# Patient Record
Sex: MALE | Race: WHITE | Employment: OTHER | URBAN - METROPOLITAN AREA
[De-identification: names, ages, dates, MRNs, and addresses within clinical notes are randomized per-mention and may not be internally consistent; named-entity substitution may affect disease eponyms.]

---

## 2017-05-25 ENCOUNTER — HOSPITAL ENCOUNTER (EMERGENCY)
Age: 77
Discharge: HOME | End: 2017-05-25
Attending: STUDENT IN AN ORGANIZED HEALTH CARE EDUCATION/TRAINING PROGRAM
Payer: MEDICARE

## 2017-05-25 ENCOUNTER — APPOINTMENT (OUTPATIENT)
Dept: GENERAL RADIOLOGY | Age: 77
End: 2017-05-25
Attending: EMERGENCY MEDICINE
Payer: MEDICARE

## 2017-05-25 VITALS
TEMPERATURE: 97.8 F | SYSTOLIC BLOOD PRESSURE: 170 MMHG | DIASTOLIC BLOOD PRESSURE: 94 MMHG | HEART RATE: 66 BPM | BODY MASS INDEX: 26.63 KG/M2 | OXYGEN SATURATION: 96 % | WEIGHT: 186 LBS | RESPIRATION RATE: 16 BRPM | HEIGHT: 70 IN

## 2017-05-25 DIAGNOSIS — R06.09 DYSPNEA ON EXERTION: ICD-10-CM

## 2017-05-25 DIAGNOSIS — R53.83 FATIGUE, UNSPECIFIED TYPE: Primary | ICD-10-CM

## 2017-05-25 LAB
ALBUMIN SERPL BCP-MCNC: 3.5 G/DL (ref 3.5–5)
ALBUMIN/GLOB SERPL: 1 {RATIO} (ref 1.1–2.2)
ALP SERPL-CCNC: 142 U/L (ref 45–117)
ALT SERPL-CCNC: 27 U/L (ref 12–78)
ANION GAP BLD CALC-SCNC: 8 MMOL/L (ref 5–15)
AST SERPL W P-5'-P-CCNC: 20 U/L (ref 15–37)
ATRIAL RATE: 85 BPM
BASOPHILS # BLD AUTO: 0.1 K/UL (ref 0–0.1)
BASOPHILS # BLD: 1 % (ref 0–1)
BILIRUB SERPL-MCNC: 1.7 MG/DL (ref 0.2–1)
BUN SERPL-MCNC: 19 MG/DL (ref 6–20)
BUN/CREAT SERPL: 17 (ref 12–20)
CALCIUM SERPL-MCNC: 9 MG/DL (ref 8.5–10.1)
CALCULATED R AXIS, ECG10: -73 DEGREES
CALCULATED T AXIS, ECG11: 104 DEGREES
CHLORIDE SERPL-SCNC: 108 MMOL/L (ref 97–108)
CK MB CFR SERPL CALC: 4.3 % (ref 0–2.5)
CK MB SERPL-MCNC: 2.4 NG/ML (ref 5–25)
CK SERPL-CCNC: 56 U/L (ref 39–308)
CO2 SERPL-SCNC: 26 MMOL/L (ref 21–32)
CREAT SERPL-MCNC: 1.1 MG/DL (ref 0.7–1.3)
DIAGNOSIS, 93000: NORMAL
EOSINOPHIL # BLD: 0.2 K/UL (ref 0–0.4)
EOSINOPHIL NFR BLD: 2 % (ref 0–7)
ERYTHROCYTE [DISTWIDTH] IN BLOOD BY AUTOMATED COUNT: 17 % (ref 11.5–14.5)
GLOBULIN SER CALC-MCNC: 3.4 G/DL (ref 2–4)
GLUCOSE SERPL-MCNC: 121 MG/DL (ref 65–100)
HCT VFR BLD AUTO: 36.2 % (ref 36.6–50.3)
HGB BLD-MCNC: 11.5 G/DL (ref 12.1–17)
IRON SATN MFR SERPL: 11 % (ref 20–50)
IRON SERPL-MCNC: 46 UG/DL (ref 35–150)
LYMPHOCYTES # BLD AUTO: 15 % (ref 12–49)
LYMPHOCYTES # BLD: 1 K/UL (ref 0.8–3.5)
MCH RBC QN AUTO: 23.2 PG (ref 26–34)
MCHC RBC AUTO-ENTMCNC: 31.8 G/DL (ref 30–36.5)
MCV RBC AUTO: 73.1 FL (ref 80–99)
MONOCYTES # BLD: 0.6 K/UL (ref 0–1)
MONOCYTES NFR BLD AUTO: 9 % (ref 5–13)
NEUTS SEG # BLD: 4.6 K/UL (ref 1.8–8)
NEUTS SEG NFR BLD AUTO: 73 % (ref 32–75)
PLATELET # BLD AUTO: 239 K/UL (ref 150–400)
POTASSIUM SERPL-SCNC: 3.5 MMOL/L (ref 3.5–5.1)
PROT SERPL-MCNC: 6.9 G/DL (ref 6.4–8.2)
Q-T INTERVAL, ECG07: 488 MS
QRS DURATION, ECG06: 186 MS
QTC CALCULATION (BEZET), ECG08: 518 MS
RBC # BLD AUTO: 4.95 M/UL (ref 4.1–5.7)
SODIUM SERPL-SCNC: 142 MMOL/L (ref 136–145)
TIBC SERPL-MCNC: 421 UG/DL (ref 250–450)
TROPONIN I SERPL-MCNC: <0.04 NG/ML
VENTRICULAR RATE, ECG03: 68 BPM
WBC # BLD AUTO: 6.4 K/UL (ref 4.1–11.1)

## 2017-05-25 PROCEDURE — 99284 EMERGENCY DEPT VISIT MOD MDM: CPT

## 2017-05-25 PROCEDURE — 36415 COLL VENOUS BLD VENIPUNCTURE: CPT | Performed by: STUDENT IN AN ORGANIZED HEALTH CARE EDUCATION/TRAINING PROGRAM

## 2017-05-25 PROCEDURE — 71020 XR CHEST PA LAT: CPT

## 2017-05-25 PROCEDURE — 93971 EXTREMITY STUDY: CPT

## 2017-05-25 PROCEDURE — 93005 ELECTROCARDIOGRAM TRACING: CPT

## 2017-05-25 PROCEDURE — 82550 ASSAY OF CK (CPK): CPT | Performed by: EMERGENCY MEDICINE

## 2017-05-25 PROCEDURE — 84484 ASSAY OF TROPONIN QUANT: CPT | Performed by: EMERGENCY MEDICINE

## 2017-05-25 PROCEDURE — 83540 ASSAY OF IRON: CPT | Performed by: STUDENT IN AN ORGANIZED HEALTH CARE EDUCATION/TRAINING PROGRAM

## 2017-05-25 PROCEDURE — 85025 COMPLETE CBC W/AUTO DIFF WBC: CPT | Performed by: EMERGENCY MEDICINE

## 2017-05-25 PROCEDURE — 80053 COMPREHEN METABOLIC PANEL: CPT | Performed by: EMERGENCY MEDICINE

## 2017-05-25 NOTE — ED NOTES
Pt resting on stretcher. Per Dr. Briseyda Meyer [facility] for pt to have water. Awaiting duplex at this moment. Will continue to monitor.

## 2017-05-25 NOTE — PROCEDURES
[facility]  *** FINAL REPORT ***    Name: Mariajose Mauricio  MRN: [id number]  : 20 Dec 1940  HIS Order #: [id number]  [id number] [facility] Visit #: [id number]  Date: 25 May 2017    TYPE OF TEST: Peripheral Venous Testing    REASON FOR TEST  Limb swelling    Right Leg:-  Deep venous thrombosis:           No  Superficial venous thrombosis:    No  Deep venous insufficiency:        Not examined  Superficial venous insufficiency: Not examined      INTERPRETATION/FINDINGS  PROCEDURE:  Color duplex ultrasound imaging of lower extremity veins. FINDINGS:       Right: The common femoral, deep femoral, femoral, popliteal,  posterior tibial, peroneal, and great saphenous are patent and without   evidence of thrombus; each is is fully compressible and there is no  narrowing of the flow channel on color Doppler imaging. Phasic flow  is observed in the common femoral vein. Left:   The common femoral vein is patent and without evidence of   thrombus. Phasic flow is observed. This extremity was not otherwise   evaluated. IMPRESSION:  No evidence of right lower extremity vein thrombosis. ADDITIONAL COMMENTS    I have personally reviewed the data relevant to the interpretation of  this  study.     TECHNOLOGIST: Cinderella Hamman, RVS, RCS  Signed: 2017 02:16 PM    PHYSICIAN: Drinda Kawasaki, MD  Signed: 2017 02:27 PM

## 2017-05-25 NOTE — DISCHARGE INSTRUCTIONS
We hope that we have addressed all of your medical concerns. The examination and treatment you received in the Emergency Department were for an emergent problem and were not intended as complete care. It is important that you follow up with your healthcare provider(s) for ongoing care. If your symptoms worsen or do not improve as expected, and you are unable to reach your usual health care provider(s), you should return to the Emergency Department. Today's healthcare is undergoing tremendous change, and patient satisfaction surveys are one of the many tools to assess the quality of medical care. You may receive a survey from the [employer] regarding your experience in the Emergency Department. I hope that your experience has been completely positive, particularly the medical care that I provided. As such, please participate in the survey; anything less than excellent does not meet my expectations or intentions. [facility] and 8 [facility] participate in nationally recognized quality of care measures. If your blood pressure is greater than 120/80, as reported below, we urge that you seek medical care to address the potential of high blood pressure, commonly known as hypertension. Hypertension can be hereditary or can be caused by certain medical conditions, pain, stress, or \"white coat syndrome. \"       Please make an appointment with your health care provider(s) for follow up of your Emergency Department visit. VITALS:   Patient Vitals for the past 8 hrs:   Temp Pulse Resp BP SpO2   05/25/17 1505 97.7 °F (36.5 °C) 63 16 (!) 162/93 95 %   05/25/17 1420 - 63 - - 97 %   05/25/17 1155 - - - (!) 148/94 -   05/25/17 1012 98.2 °F (36.8 °C) 70 18 (!) 158/108 96 %          Thank you for allowing us to provide you with medical care today. We realize that you have many choices for your emergency care needs.   Please choose us in the future for any continued health care needs. Luly Bernal [facility], [address]   Office: [phone]            Recent Results (from the past 24 hour(s))   EKG, 12 LEAD, INITIAL    Collection Time: 05/25/17 10:17 AM   Result Value Ref Range    Ventricular Rate 68 BPM    Atrial Rate 85 BPM    QRS Duration 186 ms    Q-T Interval 488 ms    QTC Calculation (Bezet) 518 ms    Calculated R Axis -73 degrees    Calculated T Axis 104 degrees    Diagnosis       Electronic ventricular pacemaker premature ventricular complexes  Left axis deviation  Nonspecific intraventricular block  When compared with ECG of 03-MAY-2016 20:20,  Electronic ventricular pacemaker has replaced Sinus rhythm  Confirmed by Guillermo Grijalva MD. ([id number]) on 5/25/2017 12:46:12 PM     CBC WITH AUTOMATED DIFF    Collection Time: 05/25/17 10:25 AM   Result Value Ref Range    WBC 6.4 4.1 - 11.1 K/uL    RBC 4.95 4.10 - 5.70 M/uL    HGB 11.5 (L) 12.1 - 17.0 g/dL    HCT 36.2 (L) 36.6 - 50.3 %    MCV 73.1 (L) 80.0 - 99.0 FL    MCH 23.2 (L) 26.0 - 34.0 PG    MCHC 31.8 30.0 - 36.5 g/dL    RDW 17.0 (H) 11.5 - 14.5 %    PLATELET 816 282 - 667 K/uL    NEUTROPHILS 73 32 - 75 %    LYMPHOCYTES 15 12 - 49 %    MONOCYTES 9 5 - 13 %    EOSINOPHILS 2 0 - 7 %    BASOPHILS 1 0 - 1 %    ABS. NEUTROPHILS 4.6 1.8 - 8.0 K/UL    ABS. LYMPHOCYTES 1.0 0.8 - 3.5 K/UL    ABS. MONOCYTES 0.6 0.0 - 1.0 K/UL    ABS. EOSINOPHILS 0.2 0.0 - 0.4 K/UL    ABS.  BASOPHILS 0.1 0.0 - 0.1 K/UL   METABOLIC PANEL, COMPREHENSIVE    Collection Time: 05/25/17 10:25 AM   Result Value Ref Range    Sodium 142 136 - 145 mmol/L    Potassium 3.5 3.5 - 5.1 mmol/L    Chloride 108 97 - 108 mmol/L    CO2 26 21 - 32 mmol/L    Anion gap 8 5 - 15 mmol/L    Glucose 121 (H) 65 - 100 mg/dL    BUN 19 6 - 20 MG/DL    Creatinine 1.10 0.70 - 1.30 MG/DL    BUN/Creatinine ratio 17 12 - 20      GFR est AA >60 >60 ml/min/1.73m2    GFR est non-AA >60 >60 ml/min/1.73m2    Calcium 9.0 8.5 - 10.1 MG/DL    Bilirubin, total 1.7 (H) 0.2 - 1.0 MG/DL    ALT (SGPT) 27 12 - 78 U/L    AST (SGOT) 20 15 - 37 U/L    Alk. phosphatase 142 (H) 45 - 117 U/L    Protein, total 6.9 6.4 - 8.2 g/dL    Albumin 3.5 3.5 - 5.0 g/dL    Globulin 3.4 2.0 - 4.0 g/dL    A-G Ratio 1.0 (L) 1.1 - 2.2     TROPONIN I    Collection Time: 05/25/17 10:25 AM   Result Value Ref Range    Troponin-I, Qt. <0.04 <0.05 ng/mL   CK W/ CKMB & INDEX    Collection Time: 05/25/17 10:25 AM   Result Value Ref Range    CK 56 39 - 308 U/L    CK - MB 2.4 <3.6 NG/ML    CK-MB Index 4.3 (H) 0 - 2.5     IRON PROFILE    Collection Time: 05/25/17 10:25 AM   Result Value Ref Range    Iron 46 35 - 150 ug/dL    TIBC 421 250 - 450 ug/dL    Iron % saturation 11 (L) 20 - 50 %       Xr Chest Pa Lat    Result Date: 5/25/2017  EXAM:  XR CHEST PA LAT INDICATION:  Bilateral foot swelling and shortness of breath that has worsened over the last several weeks. COMPARISON: 1/18/2015 TECHNIQUE: PA and lateral chest views FINDINGS: The left chest ICD and wires are stable. The cardiomediastinal contours are stable. The pulmonary vasculature is within normal limits. The lungs and pleural spaces are clear. There is no pneumothorax. The bones and upper abdomen are stable. IMPRESSION: No acute process. Stable exam.            Fatigue: Care Instructions  Your Care Instructions  Fatigue is a feeling of tiredness, exhaustion, or lack of energy. You may feel fatigue because of too much or not enough activity. It can also come from stress, lack of sleep, boredom, and poor diet. Many medical problems, such as viral infections, can cause fatigue. Emotional problems, especially depression, are often the cause of fatigue. Fatigue is most often a symptom of another problem. Treatment for fatigue depends on the cause. For example, if you have fatigue because you have a certain health problem, treating this problem also treats your fatigue.  If depression or anxiety is the cause, treatment may help. Follow-up care is a key part of your treatment and safety. Be sure to make and go to all appointments, and call your doctor if you are having problems. It's also a good idea to know your test results and keep a list of the medicines you take. How can you care for yourself at home? · Get regular exercise. But don't overdo it. Go back and forth between rest and exercise. · Get plenty of rest.  · Eat a healthy diet. Do not skip meals, especially breakfast.  · Reduce your use of caffeine, tobacco, and alcohol. Caffeine is most often found in coffee, tea, cola drinks, and chocolate. · Limit medicines that can cause fatigue. This includes tranquilizers and cold and allergy medicines. When should you call for help? Watch closely for changes in your health, and be sure to contact your doctor if:  · You have new symptoms such as fever or a rash. · Your fatigue gets worse. · You have been feeling down, depressed, or hopeless. Or you may have lost interest in things that you usually enjoy. · You are not getting better as expected. Where can you learn more? Go to http://milton-nadege.info/. Enter P066 in the search box to learn more about \"Fatigue: Care Instructions. \"  Current as of: May 27, 2016  Content Version: 11.2  © 8753-7181 [employer]. Care instructions adapted under license by [employer] (which disclaims liability or warranty for this information). If you have questions about a medical condition or this instruction, always ask your healthcare professional. [place] [address] any warranty or liability for your use of this information. Shortness of Breath: Care Instructions  Your Care Instructions  Shortness of breath has many causes. Sometimes conditions such as anxiety can lead to shortness of breath. Some people get mild shortness of breath when they exercise.  Trouble breathing also can be a symptom of a serious problem, such as asthma, lung disease, emphysema, heart problems, and pneumonia. If your shortness of breath continues, you may need tests and treatment. Watch for any changes in your breathing and other symptoms. Follow-up care is a key part of your treatment and safety. Be sure to make and go to all appointments, and call your doctor if you are having problems. Its also a good idea to know your test results and keep a list of the medicines you take. How can you care for yourself at home? · Do not smoke or allow others to smoke around you. If you need help quitting, talk to your doctor about stop-smoking programs and medicines. These can increase your chances of quitting for good. · Get plenty of rest and sleep. · Take your medicines exactly as prescribed. Call your doctor if you think you are having a problem with your medicine. · Find healthy ways to deal with stress. ¨ Exercise daily. ¨ Get plenty of sleep. ¨ Eat regularly and well. When should you call for help? Call 911 anytime you think you may need emergency care. For example, call if:  · You have severe shortness of breath. · You have symptoms of a heart attack. These may include:  ¨ Chest pain or pressure, or a strange feeling in the chest.  ¨ Sweating. ¨ Shortness of breath. ¨ Nausea or vomiting. ¨ Pain, pressure, or a strange feeling in the back, neck, jaw, or upper belly or in one or both shoulders or arms. ¨ Lightheadedness or sudden weakness. ¨ A fast or irregular heartbeat. After you call 911, the  may tell you to chew 1 adult-strength or 2 to 4 low-dose aspirin. Wait for an ambulance. Do not try to drive yourself. Call your doctor now or seek immediate medical care if:  · Your shortness of breath gets worse or you start to wheeze. Wheezing is a high-pitched sound when you breathe. · You wake up at night out of breath or have to prop your head up on several pillows to breathe.   · You are short of breath after only light activity or while at rest.  Watch closely for changes in your health, and be sure to contact your doctor if:  · You do not get better over the next 1 to 2 days. Where can you learn more? Go to http://milton-nadege.info/. Enter S780 in the search box to learn more about \"Shortness of Breath: Care Instructions. \"  Current as of: May 23, 2016  Content Version: 11.2  © 3988-3011 [employer]. Care instructions adapted under license by [employer] (which disclaims liability or warranty for this information). If you have questions about a medical condition or this instruction, always ask your healthcare professional. [address] any warranty or liability for your use of this information.

## 2017-05-25 NOTE — ED NOTES
I have reviewed discharge instructions with the patient. The patient verbalized understanding. Pt has no further questions and will be taken home by spouse. Pt ambulatory to waiting area in stable condition.

## 2017-05-25 NOTE — ED TRIAGE NOTES
Pt arrives with bilat foot swelling and some SOB that has gotten worse over the last several weeks.  Pt's cardiologist is Dr. Kalie Balderas

## 2017-05-25 NOTE — ED PROVIDER NOTES
HPI Comments: 78 y.o. male with past medical history significant for pacemaker placement, ablation of A-fib, CAD, HTN, gout, GERD, PUD, IBS, tonsillectomy, hernia repair, cholecystectomy who presents accompanied by spouse with chief complaint of SOB. Patient states he has had increasing SOB for the past ~4 weeks. He reports associated generalized weakness, fatigue and orthopnea. Patient reports increased difficulty exercising and notes \"I would be out of breath getting out of my car and walking into a store\". Patient states he has seen his PCP and Cardiologist for this multiple times where he has had several tests done including a stress test, blood test and chest X-ray. He notes all tests have \"come back normal\" and is not sure of what is causing his SOB. Patient reports additional onset of b/l leg swelling (R>L) ~3 days ago and was instructed by Cardiologist to visit ED for further testing to rule out blood clot. Spouse also complains that patient's BP has been fluctuating for the past \"couple months\" whereas it is usually stable. Patient notes he has been on Eliquis \"for the past few years\" secondary to A-fib. He explains that previous ablation resolved the A-fib but notes he is still on the Eliquis. Patient denies any recent travel. She denies any hx of blood clot. Patient denies chest pain, fever, nausea, vomiting, rash, urinary problems. There are no other acute medical concerns at this time. Social hx: +smoker; occasional. Denies ETOH use. PCP: Dom Zamora MD    Cardiology: Abelardo Burns MD    Note written by Mukesh Fernando. Ros Prasad, as dictated by Jose Michaud MD 11:32 AM      The history is provided by the patient and the spouse. No  was used.         Past Medical History:   Diagnosis Date    Atrial fibrillation [facility]) 2013    CAD (coronary artery disease)     GERD (gastroesophageal reflux disease)     Gout     Hypertension     CONTROLLED BY MEDS    IBS (irritable bowel syndrome)     Pacemaker     PUD (peptic ulcer disease)     hx of    S/P ablation of atrial fibrillation        Past Surgical History:   Procedure Laterality Date    HX GI      COLONOSCOPY X 3    HX HEENT      TONSILLECTOMY    HX HERNIA REPAIR Left     HX LAP CHOLECYSTECTOMY      HX PACEMAKER  8/21/13         Family History:   Problem Relation Age of Onset    Heart Disease Mother     Hypertension Mother     Heart Disease Father     Hypertension Father        Social History     Social History    Marital status:      Spouse name: N/A    Number of children: N/A    Years of education: N/A     Occupational History    Not on file. Social History Main Topics    Smoking status: Current Some Day Smoker    Smokeless tobacco: Never Used      Comment: pipe    Alcohol use No    Drug use: No      Comment: NO CIGS CURRENTLY. SMOKES PIPE SOMETIMES.  Sexual activity: Not on file     Other Topics Concern    Not on file     Social History Narrative         ALLERGIES: Review of patient's allergies indicates no known allergies. Review of Systems   Constitutional: Positive for fatigue. Negative for chills, diaphoresis and fever. HENT: Negative for congestion, rhinorrhea, sinus pressure, sore throat, trouble swallowing and voice change. Eyes: Negative for photophobia and visual disturbance. Respiratory: Positive for shortness of breath. Negative for cough and chest tightness. Cardiovascular: Positive for leg swelling (b/l (R>L)). Negative for chest pain and palpitations. Gastrointestinal: Negative for abdominal pain, blood in stool, constipation, diarrhea, nausea and vomiting. Musculoskeletal: Negative for arthralgias, myalgias and neck pain. Neurological: Positive for weakness (generalized). Negative for dizziness, light-headedness, numbness and headaches. All other systems reviewed and are negative.       Vitals:    05/25/17 1155 05/25/17 1420 05/25/17 1505 05/25/17 1620   BP: (!) 148/94  (!) 162/93 (!) 170/94   Pulse:  63 63 66   Resp:   16 16   Temp:   97.7 °F (36.5 °C) 97.8 °F (36.6 °C)   SpO2:  97% 95% 96%   Weight:       Height:                Physical Exam   Constitutional: He is oriented to person, place, and time. He appears well-developed and well-nourished. No distress. HENT:   Head: Normocephalic and atraumatic. Nose: Nose normal.   Mouth/Throat: Oropharynx is clear and moist. No oropharyngeal exudate. Eyes: Conjunctivae and EOM are normal. Right eye exhibits no discharge. Left eye exhibits no discharge. No scleral icterus. Neck: Normal range of motion. Neck supple. No JVD present. No tracheal deviation present. No thyromegaly present. Cardiovascular: Normal rate, regular rhythm, normal heart sounds and intact distal pulses. Exam reveals no gallop and no friction rub. No murmur heard. Pulmonary/Chest: Effort normal and breath sounds normal. No stridor. No respiratory distress. He has no wheezes. He has no rales. He exhibits no tenderness. Abdominal: Bowel sounds are normal. He exhibits no distension and no mass. There is no tenderness. There is no rebound. Musculoskeletal: Normal range of motion. He exhibits edema. He exhibits no tenderness. Trace lower extremity edema   Lymphadenopathy:     He has no cervical adenopathy. Neurological: He is alert and oriented to person, place, and time. No cranial nerve deficit. Coordination normal.   Skin: Skin is warm and dry. No rash noted. He is not diaphoretic. No erythema. No pallor. Psychiatric: He has a normal mood and affect. His behavior is normal. Judgment and thought content normal.   Note written by Alisson Francisco. [place] Members, as dictated by Pamela Trevino MD 11:32 AM         MDM  Number of Diagnoses or Management Options  Dyspnea on exertion:   Fatigue, unspecified type:   Diagnosis management comments: Dyspnea on exertion, CHF, dvt, PE.  77 y/o male presenting to ED for worsening SOB on exertion.   Pt continues to have negative workup by PCP and cardiology. Will eval with cbc, cmp, ce, ecg, duplex, Fe panel. Amount and/or Complexity of Data Reviewed  Clinical lab tests: ordered and reviewed  Tests in the radiology section of CPT®: ordered and reviewed  Review and summarize past medical records: yes    Risk of Complications, Morbidity, and/or Mortality  Presenting problems: moderate  Diagnostic procedures: moderate  Management options: moderate    Patient Progress  Patient progress: stable    ED Course       Procedures    ED EKG interpretation:  Rhythm: paced; and regular . Rate (approx.): 68; Axis: left axis deviation; ST/T wave: normal; Occasional PVC's. Note written by Trista Mckeon.  Deandre Pandey, as dictated by Porsha Forrest MD 11:32 AM

## 2017-08-31 ENCOUNTER — HOSPITAL ENCOUNTER (OUTPATIENT)
Dept: GENERAL RADIOLOGY | Age: 77
Discharge: HOME | End: 2017-08-31
Payer: MEDICARE

## 2017-08-31 DIAGNOSIS — J81.1 PULMONARY CONGESTION AND HYPOSTASIS: ICD-10-CM

## 2017-08-31 PROCEDURE — 71020 XR CHEST PA LAT: CPT

## 2018-07-26 ENCOUNTER — HOSPITAL ENCOUNTER (OUTPATIENT)
Dept: MAMMOGRAPHY | Age: 78
Discharge: HOME | End: 2018-07-26
Attending: FAMILY MEDICINE
Payer: MEDICARE

## 2018-07-26 DIAGNOSIS — N62 GYNECOMASTIA: ICD-10-CM

## 2018-07-26 PROCEDURE — 77066 DX MAMMO INCL CAD BI: CPT

## 2019-02-13 ENCOUNTER — HOSPITAL ENCOUNTER (OUTPATIENT)
Age: 79
Setting detail: OUTPATIENT SURGERY
Discharge: HOME | End: 2019-02-13
Attending: INTERNAL MEDICINE | Admitting: INTERNAL MEDICINE
Payer: MEDICARE

## 2019-02-13 ENCOUNTER — ANESTHESIA EVENT (OUTPATIENT)
Dept: ENDOSCOPY | Age: 79
End: 2019-02-13
Payer: MEDICARE

## 2019-02-13 ENCOUNTER — ANESTHESIA (OUTPATIENT)
Dept: ENDOSCOPY | Age: 79
End: 2019-02-13
Payer: MEDICARE

## 2019-02-13 VITALS
DIASTOLIC BLOOD PRESSURE: 51 MMHG | WEIGHT: 171 LBS | HEART RATE: 62 BPM | HEIGHT: 70 IN | RESPIRATION RATE: 15 BRPM | SYSTOLIC BLOOD PRESSURE: 101 MMHG | TEMPERATURE: 98 F | BODY MASS INDEX: 24.48 KG/M2 | OXYGEN SATURATION: 99 %

## 2019-02-13 PROCEDURE — 74011000250 HC RX REV CODE- 250

## 2019-02-13 PROCEDURE — 76040000007: Performed by: INTERNAL MEDICINE

## 2019-02-13 PROCEDURE — 74011250637 HC RX REV CODE- 250/637: Performed by: INTERNAL MEDICINE

## 2019-02-13 PROCEDURE — 77030010937 HC CLP LIG BSC -I: Performed by: INTERNAL MEDICINE

## 2019-02-13 PROCEDURE — 77030009426 HC FCPS BIOP ENDOSC BSC -B: Performed by: INTERNAL MEDICINE

## 2019-02-13 PROCEDURE — 88305 TISSUE EXAM BY PATHOLOGIST: CPT

## 2019-02-13 PROCEDURE — 74011250636 HC RX REV CODE- 250/636: Performed by: INTERNAL MEDICINE

## 2019-02-13 PROCEDURE — 76060000032 HC ANESTHESIA 0.5 TO 1 HR: Performed by: INTERNAL MEDICINE

## 2019-02-13 PROCEDURE — 77030013992 HC SNR POLYP ENDOSC BSC -B: Performed by: INTERNAL MEDICINE

## 2019-02-13 PROCEDURE — 74011250636 HC RX REV CODE- 250/636

## 2019-02-13 PROCEDURE — 77030027957 HC TBNG IRR ENDOGTR BUSS -B: Performed by: INTERNAL MEDICINE

## 2019-02-13 RX ORDER — PHENYLEPHRINE HCL IN 0.9% NACL 0.4MG/10ML
SYRINGE (ML) INTRAVENOUS AS NEEDED
Status: DISCONTINUED | OUTPATIENT
Start: 2019-02-13 | End: 2019-02-13 | Stop reason: HOSPADM

## 2019-02-13 RX ORDER — MIDAZOLAM HYDROCHLORIDE 1 MG/ML
.25-5 INJECTION, SOLUTION INTRAMUSCULAR; INTRAVENOUS
Status: DISCONTINUED | OUTPATIENT
Start: 2019-02-13 | End: 2019-02-13 | Stop reason: HOSPADM

## 2019-02-13 RX ORDER — FENTANYL CITRATE 50 UG/ML
200 INJECTION, SOLUTION INTRAMUSCULAR; INTRAVENOUS
Status: DISCONTINUED | OUTPATIENT
Start: 2019-02-13 | End: 2019-02-13 | Stop reason: HOSPADM

## 2019-02-13 RX ORDER — EPHEDRINE SULFATE 50 MG/ML
INJECTION, SOLUTION INTRAVENOUS AS NEEDED
Status: DISCONTINUED | OUTPATIENT
Start: 2019-02-13 | End: 2019-02-13 | Stop reason: HOSPADM

## 2019-02-13 RX ORDER — ATROPINE SULFATE 0.1 MG/ML
0.5 INJECTION INTRAVENOUS
Status: DISCONTINUED | OUTPATIENT
Start: 2019-02-13 | End: 2019-02-13 | Stop reason: HOSPADM

## 2019-02-13 RX ORDER — SODIUM CHLORIDE 9 MG/ML
150 INJECTION, SOLUTION INTRAVENOUS CONTINUOUS
Status: DISCONTINUED | OUTPATIENT
Start: 2019-02-13 | End: 2019-02-13 | Stop reason: HOSPADM

## 2019-02-13 RX ORDER — DEXTROMETHORPHAN/PSEUDOEPHED 2.5-7.5/.8
1.2 DROPS ORAL
Status: DISCONTINUED | OUTPATIENT
Start: 2019-02-13 | End: 2019-02-13 | Stop reason: HOSPADM

## 2019-02-13 RX ORDER — PROPOFOL 10 MG/ML
INJECTION, EMULSION INTRAVENOUS AS NEEDED
Status: DISCONTINUED | OUTPATIENT
Start: 2019-02-13 | End: 2019-02-13 | Stop reason: HOSPADM

## 2019-02-13 RX ORDER — EPINEPHRINE 0.1 MG/ML
1 INJECTION INTRACARDIAC; INTRAVENOUS
Status: DISCONTINUED | OUTPATIENT
Start: 2019-02-13 | End: 2019-02-13 | Stop reason: HOSPADM

## 2019-02-13 RX ORDER — LIDOCAINE HYDROCHLORIDE 20 MG/ML
INJECTION, SOLUTION EPIDURAL; INFILTRATION; INTRACAUDAL; PERINEURAL AS NEEDED
Status: DISCONTINUED | OUTPATIENT
Start: 2019-02-13 | End: 2019-02-13 | Stop reason: HOSPADM

## 2019-02-13 RX ORDER — SODIUM CHLORIDE 0.9 % (FLUSH) 0.9 %
5-40 SYRINGE (ML) INJECTION AS NEEDED
Status: DISCONTINUED | OUTPATIENT
Start: 2019-02-13 | End: 2019-02-13 | Stop reason: HOSPADM

## 2019-02-13 RX ORDER — DOFETILIDE 0.25 MG/1
250 CAPSULE ORAL 2 TIMES DAILY
COMMUNITY

## 2019-02-13 RX ORDER — SODIUM CHLORIDE 0.9 % (FLUSH) 0.9 %
5-40 SYRINGE (ML) INJECTION EVERY 8 HOURS
Status: DISCONTINUED | OUTPATIENT
Start: 2019-02-13 | End: 2019-02-13 | Stop reason: HOSPADM

## 2019-02-13 RX ADMIN — PROPOFOL 10 MG: 10 INJECTION, EMULSION INTRAVENOUS at 13:33

## 2019-02-13 RX ADMIN — PROPOFOL 20 MG: 10 INJECTION, EMULSION INTRAVENOUS at 13:26

## 2019-02-13 RX ADMIN — PROPOFOL 20 MG: 10 INJECTION, EMULSION INTRAVENOUS at 13:28

## 2019-02-13 RX ADMIN — PROPOFOL 50 MG: 10 INJECTION, EMULSION INTRAVENOUS at 13:24

## 2019-02-13 RX ADMIN — PROPOFOL 10 MG: 10 INJECTION, EMULSION INTRAVENOUS at 13:41

## 2019-02-13 RX ADMIN — Medication 80 MCG: at 13:30

## 2019-02-13 RX ADMIN — LIDOCAINE HYDROCHLORIDE 50 MG: 20 INJECTION, SOLUTION EPIDURAL; INFILTRATION; INTRACAUDAL; PERINEURAL at 13:24

## 2019-02-13 RX ADMIN — Medication 80 MCG: at 13:39

## 2019-02-13 RX ADMIN — PROPOFOL 20 MG: 10 INJECTION, EMULSION INTRAVENOUS at 13:30

## 2019-02-13 RX ADMIN — EPHEDRINE SULFATE 10 MG: 50 INJECTION, SOLUTION INTRAVENOUS at 13:47

## 2019-02-13 RX ADMIN — PROPOFOL 20 MG: 10 INJECTION, EMULSION INTRAVENOUS at 13:44

## 2019-02-13 RX ADMIN — EPHEDRINE SULFATE 10 MG: 50 INJECTION, SOLUTION INTRAVENOUS at 13:49

## 2019-02-13 RX ADMIN — SODIUM CHLORIDE: 900 INJECTION, SOLUTION INTRAVENOUS at 13:46

## 2019-02-13 RX ADMIN — SODIUM CHLORIDE: 900 INJECTION, SOLUTION INTRAVENOUS at 13:00

## 2019-02-13 RX ADMIN — Medication 80 MCG: at 13:36

## 2019-02-13 RX ADMIN — PROPOFOL 20 MG: 10 INJECTION, EMULSION INTRAVENOUS at 13:27

## 2019-02-13 RX ADMIN — Medication 80 MCG: at 13:34

## 2019-02-13 RX ADMIN — Medication 80 MCG: at 13:28

## 2019-02-13 RX ADMIN — PROPOFOL 50 MG: 10 INJECTION, EMULSION INTRAVENOUS at 13:25

## 2019-02-13 NOTE — ROUTINE PROCESS
Winnie Ingram  1940  [id number]    Situation:  Verbal report received from: SHANTELL Engel  Procedure: Procedure(s):  COLONOSCOPY  ENDOSCOPIC POLYPECTOMY  RESOLUTION CLIP    Background:    Preoperative diagnosis: HISTORY COLON POLPYS  Postoperative diagnosis: 1.- Melanosis Coli    :  Dr. Pramod Godinez  Assistant(s): Endoscopy Technician-1: Taylor Gomez  Endoscopy RN-1: Delaney Holter, RN    Specimens:   ID Type Source Tests Collected by Time Destination   1 : Cecal Polyps Preservative   Pablo Gonzales MD 2/13/2019 1337 Pathology   2 : Descending Colon Polyp Preservative   Pablo Gonzales MD 2/13/2019 1343 Pathology   3 : Rectal polyp Preservative   Pablo Gonzales MD 2/13/2019 1347 Pathology     H. Pylori  no    Assessment:  Intra-procedure medications       Anesthesia gave intra-procedure sedation and medications, see anesthesia flow sheet yes    Intravenous fluids:   300 NS @ KVO     Vital signs stable yes    Abdominal assessment: round and soft yes    Recommendation:  Discharge patient per MD order yes.   Return to floor no  Family or Friend : wife  Permission to share finding with family or friend yes

## 2019-02-13 NOTE — DISCHARGE INSTRUCTIONS
Janet Lechuga [place] [id number] Miri Albert M.D.  [address] [place] [address], [address] [place] [address] [place]   [phone]          COLONOSCOPY DISCHARGE INSTRUCTIONS    Lolis Keith  [id number]  1940    DISCOMFORT:  Redness at IV site- apply warm compress to area; if redness or soreness persist- contact your physician  There may be a slight amount of blood passed from the rectum  Gaseous discomfort- walking, belching will help relieve any discomfort  You may not operate a vehicle for 12 hours  You may not engage in an occupation involving machinery or appliances for the  rest of today  You may not drink alcoholic beverages for at least 12 hours  Avoid making any critical decisions for at least 24 hours    DIET:   You may resume your normal diet, but some patients find that heavy or large  meals may lead to indigestion or vomiting. I suggest a light meal as first food  intake. I recommend a whole food, plant-based diet for your overall health. ACTIVITY:  You may resume your normal daily activities. It is recommended that you spend the remainder of the day resting - avoid any strenuous activity. CALL M.D. IF ANY SIGN OF:   Increasing pain, nausea, vomiting  Abdominal distension (swelling)  Significant bleeding (oral or rectal)  Fever   Pain in chest area  Shortness of breath    Additional Instructions:   Call Dr. Miri Albert if any questions or problems at [phone]   You should receive the biopsy results by phone or mail within 3 weeks, if not, call  my office for the results      Should have a repeat colonoscopy in 3 years. Colonoscopy showed 5 polyps removed, melanosis coli. Resume Eliquis tomorrow.

## 2019-02-13 NOTE — ANESTHESIA PREPROCEDURE EVALUATION
Anesthetic History   No history of anesthetic complications            Review of Systems / Medical History  Patient summary reviewed, nursing notes reviewed and pertinent labs reviewed    Pulmonary  Within defined limits                 Neuro/Psych   Within defined limits           Cardiovascular  Within defined limits  Hypertension        Dysrhythmias   CAD         GI/Hepatic/Renal  Within defined limits   GERD      PUD     Endo/Other  Within defined limits           Other Findings              Physical Exam    Airway  Mallampati: II  TM Distance: > 6 cm  Neck ROM: normal range of motion   Mouth opening: Normal     Cardiovascular  Regular rate and rhythm,  S1 and S2 normal,  no murmur, click, rub, or gallop             Dental  No notable dental hx       Pulmonary  Breath sounds clear to auscultation               Abdominal  GI exam deferred       Other Findings            Anesthetic Plan    ASA: 2  Anesthesia type: MAC            Anesthetic plan and risks discussed with: Patient

## 2019-02-13 NOTE — H&P
[address], [address] [place] [address]          Pre-procedure History and Physical       NAME:  Ari Louis   :   1940   MRN:   [id number]     CHIEF COMPLAINT/HPI: See procedure note    PMH:  Past Medical History:   Diagnosis Date    Atrial fibrillation ([address]     CAD (coronary artery disease)     GERD (gastroesophageal reflux disease)     Gout     Hypertension     CONTROLLED BY MEDS    IBS (irritable bowel syndrome)     Ill-defined condition 2019    cardioversion    Pacemaker     PUD (peptic ulcer disease)     hx of    S/P ablation of atrial fibrillation        PSH:  Past Surgical History:   Procedure Laterality Date    HX GI      COLONOSCOPY X 3    HX HEENT      TONSILLECTOMY    HX HERNIA REPAIR Left     HX LAP CHOLECYSTECTOMY      HX PACEMAKER  13       Allergies:  No Known Allergies    Home Medications:  Prior to Admission Medications   Prescriptions Last Dose Informant Patient Reported? Taking? LORazepam (ATIVAN) 0.5 mg tablet 2019 at Unknown time  Yes Yes   Sig: Take 0.5 mg by mouth every eight (8) hours as needed for Anxiety. allopurinol (ZYLOPRIM) 300 mg tablet Not Taking at Unknown time  Yes No   Sig: Take 300 mg by mouth daily. apixaban (ELIQUIS) 5 mg tablet 2/10/2019 at 0700  Yes No   Sig: Take 5 mg by mouth two (2) times a day. aspirin 81 mg chewable tablet Not Taking at Unknown time  No No   Sig: Take 1 Tab by mouth daily. carvedilol (COREG) 3.125 mg tablet 2019 at Unknown time  Yes Yes   Sig: Take 3.125 mg by mouth daily. carvedilol (COREG) 6.25 mg tablet 2019 at Unknown time  Yes Yes   Sig: Take 6.25 mg by mouth every evening. colchicine (COLCRYS) 0.6 mg tablet Not Taking at Unknown time  Yes No   Sig: Take 0.6 mg by mouth as needed. dofetilide (TIKOSYN) 250 mcg capsule 2019 at 0615  Yes Yes   Sig: Take 250 mcg by mouth two (2) times a day.    irbesartan (AVAPRO) 150 mg tablet Not Taking at Unknown time  Yes No   Sig: Take 150 mg by mouth daily (with breakfast). omeprazole (PRILOSEC) 40 mg capsule 2/12/2019 at Unknown time  Yes Yes   Sig: Take 40 mg by mouth daily. simvastatin (ZOCOR) 20 mg tablet 2/12/2019 at Unknown time  Yes Yes   Sig: Take 20 mg by mouth nightly. Facility-Administered Medications: None       Hospital Medications:  Current Facility-Administered Medications   Medication Dose Route Frequency    0.9% sodium chloride infusion  150 mL/hr IntraVENous CONTINUOUS    sodium chloride (NS) flush 5-40 mL  5-40 mL IntraVENous Q8H    sodium chloride (NS) flush 5-40 mL  5-40 mL IntraVENous PRN    midazolam (VERSED) injection 0.25-5 mg  0.25-5 mg IntraVENous Multiple    fentaNYL citrate (PF) injection 200 mcg  200 mcg IntraVENous Multiple    simethicone (MYLICON) 08SA/6.1BJ oral drops 80 mg  1.2 mL Oral Multiple    atropine injection 0.5 mg  0.5 mg IntraVENous ONCE PRN    EPINEPHrine (ADRENALIN) 0.1 mg/mL syringe 1 mg  1 mg Endoscopically ONCE PRN       Family History:  Family History   Problem Relation Age of Onset    Heart Disease Mother     Hypertension Mother     Heart Disease Father     Hypertension Father        Social History:  Social History     Tobacco Use    Smoking status: Current Some Day Smoker    Smokeless tobacco: Never Used    Tobacco comment: pipe   Substance Use Topics    Alcohol use: No         PHYSICAL EXAM PRIOR TO SEDATION:  General: Alert, in no acute distress    Lungs:            CTA bilaterally  Heart:  Normal S1, S2    Abdomen: Soft, Non distended, Non tender. Normoactive bowel sounds. Assessment:   Stable for sedation administration.   Date of last colonoscopy: 3 yrs, Polyps  Yes    Plan:   · Endoscopic procedure with sedation     Signed By: Ruma Patino MD     2/13/2019  1:22 PM

## 2019-02-13 NOTE — PROCEDURES
Janet Orozco [address] Rodriguez M.D.  [facility], [address] [facility] [place]   [phone]               Colonoscopy Procedure Note    NAME: Jay Joseph  :  1940  MRN:  [id number]    Indications:   Personal history of colon polyps (screening only)     : Ritesh Hilton MD    Referring Provider:  Key Hood MD    Medicines:  MAC anesthesia      Procedure Details:  After informed consent was obtained with all risks and benefits of the procedure explained and preprocedure exam completed, the patient was placed in the left lateral decubitus position. Universal protocol for patient identification was performed and documented in the nursing notes. Throughout the procedure, the patient's blood pressure was monitored at least every five minutes; pulse, and oxygen saturations were monitored continuously. All vital signs were documented in the nursing notes. A digital rectal exam was performed and was normal.  The Olympus videocolonoscope  was inserted in the rectum and carefully advanced to the cecum, which was identified by the ileocecal valve and appendiceal orifice. The colonoscope was slowly withdrawn with careful evaluation between folds. Retroflexion in the rectum was performed; findings and interventions are described below. Procedure start time, extent reached time/cecum time, and procedure end time are documented in the nursing notes. The quality of preparation was good.        Findings:   Rectum: 1  Sessile polyp(s), the largest 5 mm in size; s/p cold snare polypectomy  Sigmoid:mild melanosis coli  Descending Colon: 1 sessile polyp, the largest 15 mm in size; s/p hot snare polypectomy and the placement of 2 hemoclips  Transverse Colon: mild melanosis coli  Ascending Colon: mild melanosis coli  Cecum: 3  Sessile polyp(s), the largest 4 mm in size; s/p cold forceps polypectomy    Interventions:    5 complete polypectomy were performed using hot snare Madiha Ambrose snare/cold forceps and the polyps were  retrieved    Specimens:   ID Type Source Tests Collected by Time Destination   1 : Cecal Polyps Preservative   Ernestina Schaeffer MD 2/13/2019 1337 Pathology   2 : Descending Colon Polyp Preservative   Ernestina Schaeffer MD 2/13/2019 1343 Pathology   3 : Rectal polyp Koreyative   Ernestina Schaeffer MD 2/13/2019 1347 Pathology       EBL:  None. Complications:   No immediate complications     Impression:  -Benign appearing colon polyps, removed as above. -Mild pan-melanosis coli. Recommendations:   -Repeat colonoscopy in 3 years. If < 10 years, reason:  above average risk patient    Resume Eliquis tomorrow      Signed by:  Manisha Steinberg MD          2/13/2019  1:56 PM

## 2019-02-13 NOTE — PROGRESS NOTES

## 2019-03-07 ENCOUNTER — HOSPITAL ENCOUNTER (OUTPATIENT)
Dept: MRI IMAGING | Age: 79
Discharge: HOME | End: 2019-03-07
Attending: NEUROLOGICAL SURGERY
Payer: MEDICARE

## 2019-03-07 DIAGNOSIS — S32.000A LUMBAR COMPRESSION FRACTURE (HCC): ICD-10-CM

## 2019-03-07 PROCEDURE — 72148 MRI LUMBAR SPINE W/O DYE: CPT

## 2019-06-06 ENCOUNTER — HOSPITAL ENCOUNTER (OUTPATIENT)
Dept: PREADMISSION TESTING | Age: 79
Discharge: HOME | End: 2019-06-06
Payer: MEDICARE

## 2019-06-06 VITALS
SYSTOLIC BLOOD PRESSURE: 96 MMHG | RESPIRATION RATE: 16 BRPM | HEART RATE: 71 BPM | BODY MASS INDEX: 24.42 KG/M2 | DIASTOLIC BLOOD PRESSURE: 67 MMHG | HEIGHT: 70 IN | WEIGHT: 170.6 LBS | TEMPERATURE: 97.9 F

## 2019-06-06 LAB
ANION GAP SERPL CALC-SCNC: 6 MMOL/L (ref 5–15)
BASOPHILS # BLD: 0.1 K/UL (ref 0–0.1)
BASOPHILS NFR BLD: 1 % (ref 0–1)
BUN SERPL-MCNC: 42 MG/DL (ref 6–20)
BUN/CREAT SERPL: 24 (ref 12–20)
CALCIUM SERPL-MCNC: 8.9 MG/DL (ref 8.5–10.1)
CHLORIDE SERPL-SCNC: 106 MMOL/L (ref 97–108)
CO2 SERPL-SCNC: 27 MMOL/L (ref 21–32)
CREAT SERPL-MCNC: 1.76 MG/DL (ref 0.7–1.3)
DIFFERENTIAL METHOD BLD: ABNORMAL
EOSINOPHIL # BLD: 0.2 K/UL (ref 0–0.4)
EOSINOPHIL NFR BLD: 2 % (ref 0–7)
ERYTHROCYTE [DISTWIDTH] IN BLOOD BY AUTOMATED COUNT: 14.1 % (ref 11.5–14.5)
GLUCOSE SERPL-MCNC: 82 MG/DL (ref 65–100)
HCT VFR BLD AUTO: 40.9 % (ref 36.6–50.3)
HGB BLD-MCNC: 12.3 G/DL (ref 12.1–17)
IMM GRANULOCYTES # BLD AUTO: 0 K/UL (ref 0–0.04)
IMM GRANULOCYTES NFR BLD AUTO: 1 % (ref 0–0.5)
LYMPHOCYTES # BLD: 1.6 K/UL (ref 0.8–3.5)
LYMPHOCYTES NFR BLD: 18 % (ref 12–49)
MCH RBC QN AUTO: 24.9 PG (ref 26–34)
MCHC RBC AUTO-ENTMCNC: 30.1 G/DL (ref 30–36.5)
MCV RBC AUTO: 83 FL (ref 80–99)
MONOCYTES # BLD: 0.7 K/UL (ref 0–1)
MONOCYTES NFR BLD: 8 % (ref 5–13)
NEUTS SEG # BLD: 6.3 K/UL (ref 1.8–8)
NEUTS SEG NFR BLD: 70 % (ref 32–75)
NRBC # BLD: 0 K/UL (ref 0–0.01)
NRBC BLD-RTO: 0 PER 100 WBC
PLATELET # BLD AUTO: 283 K/UL (ref 150–400)
PMV BLD AUTO: 10.4 FL (ref 8.9–12.9)
POTASSIUM SERPL-SCNC: 4.6 MMOL/L (ref 3.5–5.1)
RBC # BLD AUTO: 4.93 M/UL (ref 4.1–5.7)
SODIUM SERPL-SCNC: 139 MMOL/L (ref 136–145)
WBC # BLD AUTO: 8.9 K/UL (ref 4.1–11.1)

## 2019-06-06 PROCEDURE — 36415 COLL VENOUS BLD VENIPUNCTURE: CPT

## 2019-06-06 PROCEDURE — 80048 BASIC METABOLIC PNL TOTAL CA: CPT

## 2019-06-06 PROCEDURE — 85025 COMPLETE CBC W/AUTO DIFF WBC: CPT

## 2019-06-06 RX ORDER — SPIRONOLACTONE 25 MG/1
25 TABLET ORAL DAILY
COMMUNITY
End: 2019-12-17

## 2019-06-06 RX ORDER — ESCITALOPRAM OXALATE 10 MG/1
10 TABLET ORAL
COMMUNITY

## 2019-06-06 NOTE — PERIOP NOTES
DO NOT EAT OR DRINK ANYTHING AFTER MIDNIGHT, except as instructed THE NIGHT BEFORE SURGERY. PT GIVEN OPPORTUNITY TO ASK ADDITIONAL QUESTIONS. Patient given two packs of CHG wipes and instruction sheet, instructions for use reviewed with patient. Patient given surgical site infection information FAQs handout and hand hygiene tips sheet. Pre-operative instructions reviewed and patient verbalizes understanding of instructions. Patient has been given the opportunity to ask additional questions. 1640: RELEASE OF INFORMATION FAXED TO [facility]. CALL ALSO PLACED TO DR TOPETE'S OFFICE AND SPOKE WITH PATRICIA IN MR. SHE STATES SHE WILL FAX INFORMATION EITHER THIS EVENING OR TOMORROW MORNING.

## 2019-06-07 PROBLEM — R79.89 ELEVATED SERUM CREATININE: Status: ACTIVE | Noted: 2019-06-07

## 2019-06-07 LAB
BACTERIA SPEC CULT: NORMAL
BACTERIA SPEC CULT: NORMAL
SERVICE CMNT-IMP: NORMAL

## 2019-06-07 NOTE — ADVANCED PRACTICE NURSE
Spoke to patient's wife per patient's request regarding increase in creatinine on routine preop testing. Previous creatine normal 5/2017, patient denies hx of renal disease. Results called and faxed to PCP office for continuity of care. Per return call from Dr Sonia Orellana office Alfrieda File), last creatinine 1.6 1/2019, creatinine range 1.5-1.7 \"for past few years\".

## 2019-06-07 NOTE — PERIOP NOTES
REFAXED REQUEST TO [facility] - DR. COOK' OFFICE FOR MOST RECENT EKG AND OFFICE NOTE AS WE HAVE NOT YET RECEIVED. ALSO CALLED AND LEFT MESSAGE ON VOICEMAIL AT DR. MCCORMICK'S OFFICE FOR JAIMIE , HIS NURSE TO SEE IF SHE HAS RECEIVED AN EKG FROM DR. TOPETE'S OFFICE AND IF SHE DID TO FAX TO US IN PRE ADMIT TESTING SO WE CAN PLACE ON CHART FOR ANESTHESIA AS THE LAST EKG ON RECORD IN [facility] IS FROM 2017.

## 2019-06-08 ENCOUNTER — ANESTHESIA EVENT (OUTPATIENT)
Dept: SURGERY | Age: 79
End: 2019-06-08
Payer: MEDICARE

## 2019-06-08 NOTE — ANESTHESIA PREPROCEDURE EVALUATION
Relevant Problems   No relevant active problems       Anesthetic History   No history of anesthetic complications            Review of Systems / Medical History  Patient summary reviewed, nursing notes reviewed and pertinent labs reviewed    Pulmonary  Within defined limits                 Neuro/Psych   Within defined limits           Cardiovascular    Hypertension        Dysrhythmias : atrial fibrillation  Pacemaker         GI/Hepatic/Renal     GERD      PUD     Endo/Other        Arthritis     Other Findings            Physical Exam    Airway  Mallampati: II  TM Distance: > 6 cm  Neck ROM: normal range of motion   Mouth opening: Normal     Cardiovascular  Regular rate and rhythm,  S1 and S2 normal,  no murmur, click, rub, or gallop             Dental  No notable dental hx       Pulmonary  Breath sounds clear to auscultation               Abdominal  GI exam deferred       Other Findings            Anesthetic Plan    ASA: 3  Anesthesia type: general          Induction: Intravenous  Anesthetic plan and risks discussed with: Patient

## 2019-06-10 ENCOUNTER — ANESTHESIA (OUTPATIENT)
Dept: SURGERY | Age: 79
End: 2019-06-10
Payer: MEDICARE

## 2019-06-10 ENCOUNTER — HOSPITAL ENCOUNTER (OUTPATIENT)
Age: 79
Setting detail: OBSERVATION
Discharge: HOME | End: 2019-06-11
Attending: NEUROLOGICAL SURGERY | Admitting: NEUROLOGICAL SURGERY
Payer: MEDICARE

## 2019-06-10 ENCOUNTER — APPOINTMENT (OUTPATIENT)
Dept: GENERAL RADIOLOGY | Age: 79
End: 2019-06-10
Attending: NEUROLOGICAL SURGERY
Payer: MEDICARE

## 2019-06-10 DIAGNOSIS — M48.062 LUMBAR STENOSIS WITH NEUROGENIC CLAUDICATION: Primary | ICD-10-CM

## 2019-06-10 PROCEDURE — 74011250637 HC RX REV CODE- 250/637: Performed by: ANESTHESIOLOGY

## 2019-06-10 PROCEDURE — 76010000162 HC OR TIME 1.5 TO 2 HR INTENSV-TIER 1: Performed by: NEUROLOGICAL SURGERY

## 2019-06-10 PROCEDURE — 77030008684 HC TU ET CUF COVD -B: Performed by: ANESTHESIOLOGY

## 2019-06-10 PROCEDURE — 74011250636 HC RX REV CODE- 250/636: Performed by: NEUROLOGICAL SURGERY

## 2019-06-10 PROCEDURE — 77030014007 HC SPNG HEMSTAT J&J -B: Performed by: NEUROLOGICAL SURGERY

## 2019-06-10 PROCEDURE — 99218 HC RM OBSERVATION: CPT

## 2019-06-10 PROCEDURE — 74011250637 HC RX REV CODE- 250/637: Performed by: NEUROLOGICAL SURGERY

## 2019-06-10 PROCEDURE — 77030020782 HC GWN BAIR PAWS FLX 3M -B

## 2019-06-10 PROCEDURE — 77030003029 HC SUT VCRL J&J -B: Performed by: NEUROLOGICAL SURGERY

## 2019-06-10 PROCEDURE — 76210000006 HC OR PH I REC 0.5 TO 1 HR: Performed by: NEUROLOGICAL SURGERY

## 2019-06-10 PROCEDURE — 77030014647 HC SEAL FBRN TISSL BAXT -D: Performed by: NEUROLOGICAL SURGERY

## 2019-06-10 PROCEDURE — 74011000250 HC RX REV CODE- 250: Performed by: NEUROLOGICAL SURGERY

## 2019-06-10 PROCEDURE — 74011250636 HC RX REV CODE- 250/636

## 2019-06-10 PROCEDURE — 77030026438 HC STYL ET INTUB CARD -A: Performed by: ANESTHESIOLOGY

## 2019-06-10 PROCEDURE — 77030038600 HC TU BPLR IRR DISP STRY -B: Performed by: NEUROLOGICAL SURGERY

## 2019-06-10 PROCEDURE — 77030013079 HC BLNKT BAIR HGGR 3M -A: Performed by: ANESTHESIOLOGY

## 2019-06-10 PROCEDURE — 77030019908 HC STETH ESOPH SIMS -A: Performed by: ANESTHESIOLOGY

## 2019-06-10 PROCEDURE — 72020 X-RAY EXAM OF SPINE 1 VIEW: CPT

## 2019-06-10 PROCEDURE — 74011000272 HC RX REV CODE- 272: Performed by: NEUROLOGICAL SURGERY

## 2019-06-10 PROCEDURE — 77030029099 HC BN WAX SSPC -A: Performed by: NEUROLOGICAL SURGERY

## 2019-06-10 PROCEDURE — 74011000250 HC RX REV CODE- 250

## 2019-06-10 PROCEDURE — 77030018836 HC SOL IRR NACL ICUM -A: Performed by: NEUROLOGICAL SURGERY

## 2019-06-10 PROCEDURE — 76060000034 HC ANESTHESIA 1.5 TO 2 HR: Performed by: NEUROLOGICAL SURGERY

## 2019-06-10 PROCEDURE — 77030012890

## 2019-06-10 RX ORDER — GLYCOPYRROLATE 0.2 MG/ML
INJECTION INTRAMUSCULAR; INTRAVENOUS AS NEEDED
Status: DISCONTINUED | OUTPATIENT
Start: 2019-06-10 | End: 2019-06-10 | Stop reason: HOSPADM

## 2019-06-10 RX ORDER — SIMVASTATIN 20 MG/1
20 TABLET, FILM COATED ORAL
Status: DISCONTINUED | OUTPATIENT
Start: 2019-06-10 | End: 2019-06-11 | Stop reason: HOSPADM

## 2019-06-10 RX ORDER — ROCURONIUM BROMIDE 10 MG/ML
INJECTION, SOLUTION INTRAVENOUS AS NEEDED
Status: DISCONTINUED | OUTPATIENT
Start: 2019-06-10 | End: 2019-06-10 | Stop reason: HOSPADM

## 2019-06-10 RX ORDER — SODIUM CHLORIDE, SODIUM LACTATE, POTASSIUM CHLORIDE, CALCIUM CHLORIDE 600; 310; 30; 20 MG/100ML; MG/100ML; MG/100ML; MG/100ML
INJECTION, SOLUTION INTRAVENOUS
Status: DISCONTINUED | OUTPATIENT
Start: 2019-06-10 | End: 2019-06-10 | Stop reason: HOSPADM

## 2019-06-10 RX ORDER — MIDAZOLAM HYDROCHLORIDE 1 MG/ML
INJECTION, SOLUTION INTRAMUSCULAR; INTRAVENOUS AS NEEDED
Status: DISCONTINUED | OUTPATIENT
Start: 2019-06-10 | End: 2019-06-10 | Stop reason: HOSPADM

## 2019-06-10 RX ORDER — ONDANSETRON 2 MG/ML
INJECTION INTRAMUSCULAR; INTRAVENOUS AS NEEDED
Status: DISCONTINUED | OUTPATIENT
Start: 2019-06-10 | End: 2019-06-10 | Stop reason: HOSPADM

## 2019-06-10 RX ORDER — EPHEDRINE SULFATE/0.9% NACL/PF 50 MG/5 ML
5 SYRINGE (ML) INTRAVENOUS AS NEEDED
Status: DISCONTINUED | OUTPATIENT
Start: 2019-06-10 | End: 2019-06-10 | Stop reason: HOSPADM

## 2019-06-10 RX ORDER — PROPOFOL 10 MG/ML
INJECTION, EMULSION INTRAVENOUS AS NEEDED
Status: DISCONTINUED | OUTPATIENT
Start: 2019-06-10 | End: 2019-06-10 | Stop reason: HOSPADM

## 2019-06-10 RX ORDER — SPIRONOLACTONE 25 MG/1
25 TABLET ORAL DAILY
Status: DISCONTINUED | OUTPATIENT
Start: 2019-06-10 | End: 2019-06-11 | Stop reason: HOSPADM

## 2019-06-10 RX ORDER — OMEPRAZOLE 40 MG/1
40 CAPSULE, DELAYED RELEASE ORAL
Status: DISCONTINUED | OUTPATIENT
Start: 2019-06-10 | End: 2019-06-10 | Stop reason: CLARIF

## 2019-06-10 RX ORDER — BUPIVACAINE HYDROCHLORIDE 5 MG/ML
INJECTION, SOLUTION EPIDURAL; INTRACAUDAL AS NEEDED
Status: DISCONTINUED | OUTPATIENT
Start: 2019-06-10 | End: 2019-06-10 | Stop reason: HOSPADM

## 2019-06-10 RX ORDER — LIDOCAINE HYDROCHLORIDE 10 MG/ML
0.1 INJECTION, SOLUTION EPIDURAL; INFILTRATION; INTRACAUDAL; PERINEURAL AS NEEDED
Status: DISCONTINUED | OUTPATIENT
Start: 2019-06-10 | End: 2019-06-10 | Stop reason: HOSPADM

## 2019-06-10 RX ORDER — CEFAZOLIN SODIUM/WATER 2 G/20 ML
2 SYRINGE (ML) INTRAVENOUS ONCE
Status: COMPLETED | OUTPATIENT
Start: 2019-06-10 | End: 2019-06-10

## 2019-06-10 RX ORDER — LORAZEPAM 0.5 MG/1
0.5 TABLET ORAL
Status: DISCONTINUED | OUTPATIENT
Start: 2019-06-10 | End: 2019-06-11 | Stop reason: HOSPADM

## 2019-06-10 RX ORDER — NEOSTIGMINE METHYLSULFATE 1 MG/ML
INJECTION INTRAVENOUS AS NEEDED
Status: DISCONTINUED | OUTPATIENT
Start: 2019-06-10 | End: 2019-06-10 | Stop reason: HOSPADM

## 2019-06-10 RX ORDER — SODIUM CHLORIDE 0.9 % (FLUSH) 0.9 %
5-40 SYRINGE (ML) INJECTION AS NEEDED
Status: DISCONTINUED | OUTPATIENT
Start: 2019-06-10 | End: 2019-06-11 | Stop reason: HOSPADM

## 2019-06-10 RX ORDER — DIPHENHYDRAMINE HCL 25 MG
25 CAPSULE ORAL
Status: DISCONTINUED | OUTPATIENT
Start: 2019-06-10 | End: 2019-06-11 | Stop reason: HOSPADM

## 2019-06-10 RX ORDER — MORPHINE SULFATE 10 MG/ML
2 INJECTION, SOLUTION INTRAMUSCULAR; INTRAVENOUS
Status: DISCONTINUED | OUTPATIENT
Start: 2019-06-10 | End: 2019-06-10 | Stop reason: HOSPADM

## 2019-06-10 RX ORDER — MIDAZOLAM HYDROCHLORIDE 1 MG/ML
1 INJECTION, SOLUTION INTRAMUSCULAR; INTRAVENOUS AS NEEDED
Status: DISCONTINUED | OUTPATIENT
Start: 2019-06-10 | End: 2019-06-10 | Stop reason: HOSPADM

## 2019-06-10 RX ORDER — ONDANSETRON 2 MG/ML
4 INJECTION INTRAMUSCULAR; INTRAVENOUS AS NEEDED
Status: DISCONTINUED | OUTPATIENT
Start: 2019-06-10 | End: 2019-06-10 | Stop reason: HOSPADM

## 2019-06-10 RX ORDER — LIDOCAINE HYDROCHLORIDE 20 MG/ML
INJECTION, SOLUTION EPIDURAL; INFILTRATION; INTRACAUDAL; PERINEURAL AS NEEDED
Status: DISCONTINUED | OUTPATIENT
Start: 2019-06-10 | End: 2019-06-10 | Stop reason: HOSPADM

## 2019-06-10 RX ORDER — FENTANYL CITRATE 50 UG/ML
25 INJECTION, SOLUTION INTRAMUSCULAR; INTRAVENOUS
Status: DISCONTINUED | OUTPATIENT
Start: 2019-06-10 | End: 2019-06-10 | Stop reason: HOSPADM

## 2019-06-10 RX ORDER — MIDAZOLAM HYDROCHLORIDE 1 MG/ML
0.5 INJECTION, SOLUTION INTRAMUSCULAR; INTRAVENOUS
Status: DISCONTINUED | OUTPATIENT
Start: 2019-06-10 | End: 2019-06-10 | Stop reason: HOSPADM

## 2019-06-10 RX ORDER — OXYCODONE HYDROCHLORIDE 5 MG/1
10 TABLET ORAL
Status: DISCONTINUED | OUTPATIENT
Start: 2019-06-10 | End: 2019-06-11 | Stop reason: HOSPADM

## 2019-06-10 RX ORDER — ACETAMINOPHEN 325 MG/1
650 TABLET ORAL ONCE
Status: COMPLETED | OUTPATIENT
Start: 2019-06-10 | End: 2019-06-10

## 2019-06-10 RX ORDER — PANTOPRAZOLE SODIUM 40 MG/1
40 TABLET, DELAYED RELEASE ORAL
Status: DISCONTINUED | OUTPATIENT
Start: 2019-06-10 | End: 2019-06-11 | Stop reason: HOSPADM

## 2019-06-10 RX ORDER — ESCITALOPRAM OXALATE 10 MG/1
10 TABLET ORAL
Status: DISCONTINUED | OUTPATIENT
Start: 2019-06-10 | End: 2019-06-11 | Stop reason: HOSPADM

## 2019-06-10 RX ORDER — SODIUM CHLORIDE 9 MG/ML
25 INJECTION, SOLUTION INTRAVENOUS CONTINUOUS
Status: DISCONTINUED | OUTPATIENT
Start: 2019-06-10 | End: 2019-06-10 | Stop reason: HOSPADM

## 2019-06-10 RX ORDER — PHENYLEPHRINE 10 MG/250 ML(40 MCG/ML)IN 0.9 % SOD.CHLORIDE INTRAVENOUS
Status: DISCONTINUED | OUTPATIENT
Start: 2019-06-10 | End: 2019-06-10 | Stop reason: HOSPADM

## 2019-06-10 RX ORDER — DEXAMETHASONE SODIUM PHOSPHATE 4 MG/ML
INJECTION, SOLUTION INTRA-ARTICULAR; INTRALESIONAL; INTRAMUSCULAR; INTRAVENOUS; SOFT TISSUE AS NEEDED
Status: DISCONTINUED | OUTPATIENT
Start: 2019-06-10 | End: 2019-06-10 | Stop reason: HOSPADM

## 2019-06-10 RX ORDER — FENTANYL CITRATE 50 UG/ML
INJECTION, SOLUTION INTRAMUSCULAR; INTRAVENOUS AS NEEDED
Status: DISCONTINUED | OUTPATIENT
Start: 2019-06-10 | End: 2019-06-10 | Stop reason: HOSPADM

## 2019-06-10 RX ORDER — TRAMADOL HYDROCHLORIDE 50 MG/1
50 TABLET ORAL
Status: DISCONTINUED | OUTPATIENT
Start: 2019-06-10 | End: 2019-06-11 | Stop reason: HOSPADM

## 2019-06-10 RX ORDER — FENTANYL CITRATE 50 UG/ML
50 INJECTION, SOLUTION INTRAMUSCULAR; INTRAVENOUS AS NEEDED
Status: DISCONTINUED | OUTPATIENT
Start: 2019-06-10 | End: 2019-06-10 | Stop reason: HOSPADM

## 2019-06-10 RX ORDER — ONDANSETRON 2 MG/ML
4 INJECTION INTRAMUSCULAR; INTRAVENOUS
Status: ACTIVE | OUTPATIENT
Start: 2019-06-10 | End: 2019-06-11

## 2019-06-10 RX ORDER — ROPIVACAINE HYDROCHLORIDE 5 MG/ML
150 INJECTION, SOLUTION EPIDURAL; INFILTRATION; PERINEURAL AS NEEDED
Status: DISCONTINUED | OUTPATIENT
Start: 2019-06-10 | End: 2019-06-10 | Stop reason: HOSPADM

## 2019-06-10 RX ORDER — PHENYLEPHRINE HCL IN 0.9% NACL 0.4MG/10ML
SYRINGE (ML) INTRAVENOUS AS NEEDED
Status: DISCONTINUED | OUTPATIENT
Start: 2019-06-10 | End: 2019-06-10 | Stop reason: HOSPADM

## 2019-06-10 RX ORDER — SODIUM CHLORIDE 0.9 % (FLUSH) 0.9 %
5-40 SYRINGE (ML) INJECTION EVERY 8 HOURS
Status: DISCONTINUED | OUTPATIENT
Start: 2019-06-10 | End: 2019-06-11 | Stop reason: HOSPADM

## 2019-06-10 RX ORDER — SODIUM CHLORIDE, SODIUM LACTATE, POTASSIUM CHLORIDE, CALCIUM CHLORIDE 600; 310; 30; 20 MG/100ML; MG/100ML; MG/100ML; MG/100ML
1000 INJECTION, SOLUTION INTRAVENOUS CONTINUOUS
Status: DISCONTINUED | OUTPATIENT
Start: 2019-06-10 | End: 2019-06-10 | Stop reason: HOSPADM

## 2019-06-10 RX ORDER — DOFETILIDE 0.12 MG/1
250 CAPSULE ORAL 2 TIMES DAILY
Status: DISCONTINUED | OUTPATIENT
Start: 2019-06-10 | End: 2019-06-11 | Stop reason: HOSPADM

## 2019-06-10 RX ORDER — CEFAZOLIN SODIUM/WATER 2 G/20 ML
2 SYRINGE (ML) INTRAVENOUS EVERY 8 HOURS
Status: COMPLETED | OUTPATIENT
Start: 2019-06-10 | End: 2019-06-11

## 2019-06-10 RX ORDER — OXYCODONE HYDROCHLORIDE 5 MG/1
5 TABLET ORAL AS NEEDED
Status: DISCONTINUED | OUTPATIENT
Start: 2019-06-10 | End: 2019-06-10 | Stop reason: HOSPADM

## 2019-06-10 RX ORDER — OXYCODONE HYDROCHLORIDE 5 MG/1
5 TABLET ORAL
Status: DISCONTINUED | OUTPATIENT
Start: 2019-06-10 | End: 2019-06-11 | Stop reason: HOSPADM

## 2019-06-10 RX ORDER — KETOROLAC TROMETHAMINE 30 MG/ML
15 INJECTION, SOLUTION INTRAMUSCULAR; INTRAVENOUS EVERY 6 HOURS
Status: DISCONTINUED | OUTPATIENT
Start: 2019-06-10 | End: 2019-06-10

## 2019-06-10 RX ORDER — SODIUM CHLORIDE 9 MG/ML
125 INJECTION, SOLUTION INTRAVENOUS CONTINUOUS
Status: DISPENSED | OUTPATIENT
Start: 2019-06-10 | End: 2019-06-11

## 2019-06-10 RX ORDER — HYDROMORPHONE HYDROCHLORIDE 1 MG/ML
1 INJECTION, SOLUTION INTRAMUSCULAR; INTRAVENOUS; SUBCUTANEOUS
Status: ACTIVE | OUTPATIENT
Start: 2019-06-10 | End: 2019-06-11

## 2019-06-10 RX ORDER — HYDROMORPHONE HYDROCHLORIDE 1 MG/ML
0.2 INJECTION, SOLUTION INTRAMUSCULAR; INTRAVENOUS; SUBCUTANEOUS
Status: DISCONTINUED | OUTPATIENT
Start: 2019-06-10 | End: 2019-06-10 | Stop reason: HOSPADM

## 2019-06-10 RX ORDER — SODIUM CHLORIDE, SODIUM LACTATE, POTASSIUM CHLORIDE, CALCIUM CHLORIDE 600; 310; 30; 20 MG/100ML; MG/100ML; MG/100ML; MG/100ML
100 INJECTION, SOLUTION INTRAVENOUS CONTINUOUS
Status: DISCONTINUED | OUTPATIENT
Start: 2019-06-10 | End: 2019-06-10 | Stop reason: HOSPADM

## 2019-06-10 RX ORDER — NALOXONE HYDROCHLORIDE 0.4 MG/ML
0.4 INJECTION, SOLUTION INTRAMUSCULAR; INTRAVENOUS; SUBCUTANEOUS AS NEEDED
Status: DISCONTINUED | OUTPATIENT
Start: 2019-06-10 | End: 2019-06-11 | Stop reason: HOSPADM

## 2019-06-10 RX ORDER — DIPHENHYDRAMINE HYDROCHLORIDE 50 MG/ML
12.5 INJECTION, SOLUTION INTRAMUSCULAR; INTRAVENOUS AS NEEDED
Status: DISCONTINUED | OUTPATIENT
Start: 2019-06-10 | End: 2019-06-10 | Stop reason: HOSPADM

## 2019-06-10 RX ORDER — SUCCINYLCHOLINE CHLORIDE 20 MG/ML
INJECTION INTRAMUSCULAR; INTRAVENOUS AS NEEDED
Status: DISCONTINUED | OUTPATIENT
Start: 2019-06-10 | End: 2019-06-10 | Stop reason: HOSPADM

## 2019-06-10 RX ORDER — CARVEDILOL 3.12 MG/1
3.12 TABLET ORAL 2 TIMES DAILY WITH MEALS
Status: DISCONTINUED | OUTPATIENT
Start: 2019-06-10 | End: 2019-06-11 | Stop reason: HOSPADM

## 2019-06-10 RX ADMIN — MIDAZOLAM HYDROCHLORIDE 1 MG: 1 INJECTION, SOLUTION INTRAMUSCULAR; INTRAVENOUS at 07:25

## 2019-06-10 RX ADMIN — NEOSTIGMINE METHYLSULFATE 3 MG: 1 INJECTION INTRAVENOUS at 08:44

## 2019-06-10 RX ADMIN — SUCCINYLCHOLINE CHLORIDE 120 MG: 20 INJECTION INTRAMUSCULAR; INTRAVENOUS at 07:25

## 2019-06-10 RX ADMIN — Medication 120 MCG: at 07:41

## 2019-06-10 RX ADMIN — SODIUM CHLORIDE, SODIUM LACTATE, POTASSIUM CHLORIDE, CALCIUM CHLORIDE: 600; 310; 30; 20 INJECTION, SOLUTION INTRAVENOUS at 07:23

## 2019-06-10 RX ADMIN — ROCURONIUM BROMIDE 10 MG: 10 INJECTION, SOLUTION INTRAVENOUS at 08:26

## 2019-06-10 RX ADMIN — OXYCODONE HYDROCHLORIDE 5 MG: 5 TABLET ORAL at 15:00

## 2019-06-10 RX ADMIN — LIDOCAINE HYDROCHLORIDE 100 MG: 20 INJECTION, SOLUTION EPIDURAL; INFILTRATION; INTRACAUDAL; PERINEURAL at 07:25

## 2019-06-10 RX ADMIN — GLYCOPYRROLATE 0.6 MG: 0.2 INJECTION INTRAMUSCULAR; INTRAVENOUS at 08:44

## 2019-06-10 RX ADMIN — PHENYLEPHRINE 10 MG/250 ML(40 MCG/ML)IN 0.9 % SOD.CHLORIDE INTRAVENOUS 50 MCG/MIN: at 07:55

## 2019-06-10 RX ADMIN — MIDAZOLAM HYDROCHLORIDE 1 MG: 1 INJECTION, SOLUTION INTRAMUSCULAR; INTRAVENOUS at 07:23

## 2019-06-10 RX ADMIN — SODIUM CHLORIDE 125 ML/HR: 900 INJECTION, SOLUTION INTRAVENOUS at 18:12

## 2019-06-10 RX ADMIN — PANTOPRAZOLE SODIUM 40 MG: 40 TABLET, DELAYED RELEASE ORAL at 21:13

## 2019-06-10 RX ADMIN — Medication 2 G: at 18:22

## 2019-06-10 RX ADMIN — DOFETILIDE 250 MCG: 0.12 CAPSULE ORAL at 18:22

## 2019-06-10 RX ADMIN — FENTANYL CITRATE 100 MCG: 50 INJECTION, SOLUTION INTRAMUSCULAR; INTRAVENOUS at 07:25

## 2019-06-10 RX ADMIN — DEXAMETHASONE SODIUM PHOSPHATE 8 MG: 4 INJECTION, SOLUTION INTRA-ARTICULAR; INTRALESIONAL; INTRAMUSCULAR; INTRAVENOUS; SOFT TISSUE at 08:01

## 2019-06-10 RX ADMIN — Medication 10 ML: at 21:14

## 2019-06-10 RX ADMIN — PROPOFOL 130 MG: 10 INJECTION, EMULSION INTRAVENOUS at 07:25

## 2019-06-10 RX ADMIN — Medication 10 ML: at 18:23

## 2019-06-10 RX ADMIN — ESCITALOPRAM OXALATE 10 MG: 10 TABLET ORAL at 21:13

## 2019-06-10 RX ADMIN — ONDANSETRON 4 MG: 2 INJECTION INTRAMUSCULAR; INTRAVENOUS at 08:42

## 2019-06-10 RX ADMIN — ACETAMINOPHEN 650 MG: 325 TABLET ORAL at 06:36

## 2019-06-10 RX ADMIN — Medication 2 G: at 07:30

## 2019-06-10 RX ADMIN — Medication 200 MCG: at 07:25

## 2019-06-10 RX ADMIN — Medication 120 MCG: at 07:49

## 2019-06-10 RX ADMIN — SODIUM CHLORIDE 125 ML/HR: 900 INJECTION, SOLUTION INTRAVENOUS at 10:00

## 2019-06-10 RX ADMIN — SIMVASTATIN 20 MG: 20 TABLET, FILM COATED ORAL at 21:13

## 2019-06-10 RX ADMIN — OXYCODONE HYDROCHLORIDE 5 MG: 5 TABLET ORAL at 10:31

## 2019-06-10 RX ADMIN — ROCURONIUM BROMIDE 30 MG: 10 INJECTION, SOLUTION INTRAVENOUS at 07:29

## 2019-06-10 RX ADMIN — LORAZEPAM 0.5 MG: 0.5 TABLET ORAL at 21:13

## 2019-06-10 NOTE — ANESTHESIA POSTPROCEDURE EVALUATION
Post-Anesthesia Evaluation and Assessment    Patient: Desiree Barrera MRN: [id number]  SSN: [id number]    YOB: 1940  Age: 70 y.o. Sex: male      I have evaluated the patient and they are stable and ready for discharge from the PACU. Cardiovascular Function/Vital Signs  Visit Vitals  /81   Pulse 86   Temp 36.6 °C (97.8 °F)   Resp 12   Ht 5' 10\" (1.778 m)   Wt 77.1 kg (170 lb)   SpO2 99%   BMI 24.39 kg/m²       Patient is status post General anesthesia for Procedure(s):  BILATERAL L4,5 LAMINOTOMIES. Nausea/Vomiting: None    Postoperative hydration reviewed and adequate. Pain:  Pain Intensity 1: 6 (06/10/19 0622)   Managed    Neurological Status:   Neuro (WDL): Within Defined Limits (06/10/19 0626)   At baseline    Mental Status, Level of Consciousness: Alert and  oriented to person, place, and time    Pulmonary Status:   O2 Device: CO2 nasal cannula (06/10/19 0910)   Adequate oxygenation and airway patent    Complications related to anesthesia: None    Post-anesthesia assessment completed. No concerns    Signed By: Jamie Monge MD     Naomi 10, 2019              Procedure(s):  BILATERAL L4,5 LAMINOTOMIES. general    <BSHSIANPOST>    Vitals Value Taken Time   /66 6/10/2019  9:20 AM   Temp 36.6 °C (97.8 °F) 6/10/2019  9:10 AM   Pulse 79 6/10/2019  9:23 AM   Resp 17 6/10/2019  9:23 AM   SpO2 99 % 6/10/2019  9:23 AM   Vitals shown include unvalidated device data.

## 2019-06-10 NOTE — PROGRESS NOTES
Occupational Therapy   Orders received, chart review completed. Note patient POD #0 from BILATERAL L4,5 LAMINOTOMIES. Per pathway, OT will follow up on POD #1 for evaluation. Recommend OOB to chair three times a day for meals and self-completion of ADLs as able and medically stable. Thank you.

## 2019-06-10 NOTE — PROGRESS NOTES
PT WAS D/T VOID APROX 1330. PT VOIDED 125 AT 4480. PT BLADDER SCANNED AT 1245 AND FOUND TO HAVE 827 ML. APROX 1300 PT VOIDED ADDITIONAL TIME <100. PT CONTINUED TO REQUEST TO ATTEMPT TO VOID, DECLINING STRAIGHT CATH. PT VOIDED 125 AT 1501. CONTINUING TO MONITOR.

## 2019-06-10 NOTE — PERIOP NOTES
TRANSFER - OUT REPORT:    Verbal report given to Chencho Florence RN(name) on Ghulam Shah  being transferred to 54(unit) for routine post - op       Report consisted of patients Situation, Background, Assessment and   Recommendations(SBAR). Time Pre op antibiotic given:0730  Anesthesia Stop time: 5269      Information from the following report(s) SBAR, Procedure Summary, Intake/Output and MAR was reviewed with the receiving nurse. Opportunity for questions and clarification was provided. Is the patient on 02? NO           Is the patient on a monitor? NO    Is the nurse transporting with the patient? NO    Surgical Waiting Area notified of patient's transfer from PACU?  YES      The following personal items collected during your admission accompanied patient upon transfer:   Dental Appliance: Dental Appliances: None  Vision:  glasses  Hearing Aid:    Jewelry:    Clothing:  belongings bag  Other Valuables:    Valuables sent to safe:

## 2019-06-10 NOTE — BRIEF OP NOTE
BRIEF OPERATIVE NOTE    Date of Procedure: 6/10/2019   Preoperative Diagnosis: LUMBAR STENOSIS  Postoperative Diagnosis: LUMBAR STENOSIS    Procedure(s):  BILATERAL L4,5 LAMINOTOMIES  Surgeon(s) and Role:     Klever Youssef MD - Primary         Surgical Assistant: Mandy Albright    Surgical Staff:  Circ-1: Jossie Garza RN  Scrub RN-1: Bety Ramires RN; Leonides Zaman RN  Surg Asst-1: Gretchen Gerard Staff: Elvin Luna RN  Event Time In Time Out   Incision Start 9989    Incision Close 2595      Anesthesia: General   Estimated Blood Loss: 50  Specimens: * No specimens in log *   Findings: stenosis   Complications: no  Implants: * No implants in log *

## 2019-06-11 VITALS
HEIGHT: 70 IN | WEIGHT: 170 LBS | RESPIRATION RATE: 16 BRPM | TEMPERATURE: 97.7 F | BODY MASS INDEX: 24.34 KG/M2 | SYSTOLIC BLOOD PRESSURE: 118 MMHG | HEART RATE: 79 BPM | OXYGEN SATURATION: 96 % | DIASTOLIC BLOOD PRESSURE: 76 MMHG

## 2019-06-11 LAB
ANION GAP SERPL CALC-SCNC: 5 MMOL/L (ref 5–15)
BUN SERPL-MCNC: 36 MG/DL (ref 6–20)
BUN/CREAT SERPL: 23 (ref 12–20)
CALCIUM SERPL-MCNC: 8.6 MG/DL (ref 8.5–10.1)
CHLORIDE SERPL-SCNC: 111 MMOL/L (ref 97–108)
CO2 SERPL-SCNC: 23 MMOL/L (ref 21–32)
CREAT SERPL-MCNC: 1.58 MG/DL (ref 0.7–1.3)
GLUCOSE SERPL-MCNC: 133 MG/DL (ref 65–100)
MAGNESIUM SERPL-MCNC: 2.1 MG/DL (ref 1.6–2.4)
POTASSIUM SERPL-SCNC: 4.8 MMOL/L (ref 3.5–5.1)
SODIUM SERPL-SCNC: 139 MMOL/L (ref 136–145)

## 2019-06-11 PROCEDURE — 36415 COLL VENOUS BLD VENIPUNCTURE: CPT

## 2019-06-11 PROCEDURE — 74011250636 HC RX REV CODE- 250/636: Performed by: NEUROLOGICAL SURGERY

## 2019-06-11 PROCEDURE — 97165 OT EVAL LOW COMPLEX 30 MIN: CPT

## 2019-06-11 PROCEDURE — 97116 GAIT TRAINING THERAPY: CPT | Performed by: PHYSICAL THERAPIST

## 2019-06-11 PROCEDURE — 80048 BASIC METABOLIC PNL TOTAL CA: CPT

## 2019-06-11 PROCEDURE — 83735 ASSAY OF MAGNESIUM: CPT

## 2019-06-11 PROCEDURE — 74011250637 HC RX REV CODE- 250/637: Performed by: NEUROLOGICAL SURGERY

## 2019-06-11 PROCEDURE — 99218 HC RM OBSERVATION: CPT

## 2019-06-11 PROCEDURE — 97161 PT EVAL LOW COMPLEX 20 MIN: CPT | Performed by: PHYSICAL THERAPIST

## 2019-06-11 RX ORDER — POLYETHYLENE GLYCOL 3350 17 G/17G
17 POWDER, FOR SOLUTION ORAL DAILY
Qty: 30 PACKET | Refills: 0 | Status: SHIPPED | OUTPATIENT
Start: 2019-06-11 | End: 2019-12-17

## 2019-06-11 RX ORDER — OXYCODONE HYDROCHLORIDE 5 MG/1
5 TABLET ORAL
Qty: 40 TAB | Refills: 0 | Status: SHIPPED | OUTPATIENT
Start: 2019-06-11 | End: 2019-06-18

## 2019-06-11 RX ORDER — NALOXONE HYDROCHLORIDE 4 MG/.1ML
SPRAY NASAL
Qty: 1 EACH | Refills: 0 | Status: SHIPPED | OUTPATIENT
Start: 2019-06-11 | End: 2019-12-17

## 2019-06-11 RX ADMIN — OXYCODONE HYDROCHLORIDE 10 MG: 5 TABLET ORAL at 10:41

## 2019-06-11 RX ADMIN — Medication 2 G: at 00:06

## 2019-06-11 RX ADMIN — OXYCODONE HYDROCHLORIDE 10 MG: 5 TABLET ORAL at 00:06

## 2019-06-11 RX ADMIN — CARVEDILOL 3.12 MG: 3.12 TABLET, FILM COATED ORAL at 10:48

## 2019-06-11 RX ADMIN — Medication 10 ML: at 06:38

## 2019-06-11 RX ADMIN — DOFETILIDE 250 MCG: 0.12 CAPSULE ORAL at 10:48

## 2019-06-11 NOTE — PROGRESS NOTES
Neurosurgery Spine Progress Note  Chelle Yee AGACNP-BC  Work Cell: [phone]    Post Op Day: 1 Day Post-Op    2019 10:25 AM     Tanvir Ingram    HPI:      Tanvir Ingram is a 70 y.o. male with prior medical history notable for Atrial fibrillation s/p pacemaker placement, CKD, anxiety, PTSD, CAD, GERD, gout, and lumbar stenosis. He has severe left L5 radiculopathy with significant limitations in standing or walking tolerance. Workup revealed focal high grade left lateral recess stenosis at L4-5 on left. After a discussion of the risks, benefits, and alternatives, Tanvir Ingram consented to undergo a  Procedure(s):  BILATERAL L4,5 LAMINOTOMIES    Subjective    great recovery postop. Left leg pain resolved. Surgical pain managed. Questions answered regarding postop care at home. Patient denies headache, dizziness, chest pain, sob, abdominal pain, fever, chills, or nausea/vomiting. Objective:     Physical Exam:  General:  Alert and oriented. Slightly anxious   Respiratory:  Breathing unlabored. Equal chest expansion   Abdomen:   CV: Soft, non-tender, non-distended  Heart RRR, no murmur. No edema appreciated in the extremities   Neurologic:    Musculoskeletal:  Speech fluent. Face symmetric. Follows commands. CAMEJO/SILT Motor: unchanged  L2-S1. Gait deferred  Calves soft, nontender upon palpation and with passive stretch. Moves both upper and lower extremities. Incision: clean, dry, and intact. No significant erythema or swelling. No active drainage noted. Vital Signs:    Patient Vitals for the past 8 hrs:   BP Temp Pulse Resp SpO2   19 0819 110/66 97.8 °F (36.6 °C) 74 16 94 %   19 0340 122/73 98.1 °F (36.7 °C) 96 16 92 %     Temp (24hrs), Av.8 °F (36.6 °C), Min:97.5 °F (36.4 °C), Max:98.2 °F (36.8 °C)      Intake/Output:  No intake/output data recorded.    1901 -  0700  In: 600 [I.V.:600]  Out: 1600 [Urine:1500]    Pain Control: Pain Assessment  Pain Scale 1: Numeric (0 - 10)  Pain Intensity 1: 0  Pain Onset 1: postop  Pain Location 1: Back  Pain Orientation 1: Mid  Pain Description 1: Aching  Pain Intervention(s) 1: Medication (see MAR)    LAB:    No results for input(s): HCT, HGB, HCTEXT, HGBEXT in the last 72 hours. Lab Results   Component Value Date/Time    Sodium 139 06/11/2019 04:12 AM    Potassium 4.8 06/11/2019 04:12 AM    Chloride 111 (H) 06/11/2019 04:12 AM    CO2 23 06/11/2019 04:12 AM    Glucose 133 (H) 06/11/2019 04:12 AM    BUN 36 (H) 06/11/2019 04:12 AM    Creatinine 1.58 (H) 06/11/2019 04:12 AM    Calcium 8.6 06/11/2019 04:12 AM       PT/OT:   Gait:  Gait  Base of Support: Narrowed  Speed/Erlinda: Pace decreased (<100 feet/min), Shuffled, Slow  Step Length: Left shortened, Right shortened  Gait Abnormalities: Decreased step clearance, Shuffling gait  Ambulation - Level of Assistance: Stand-by assistance, Assist x1  Distance (ft): 300 Feet (ft)  Assistive Device: Gait belt  Rail Use: Left   Stairs - Level of Assistance: Supervision  Number of Stairs Trained: 4                   Assessment/Plan      1. 1 Day Post-Op Procedure(s):  BILATERAL L4,5 LAMINOTOMIES   -Continue PT/OT   -Pain control- PRN oxycodone   -Voiding   -Incentive Spirometer   -Tolerating diet   -VTE Prophylaxes - TEDS & SCDs    2. Atrial fibrillation s/p ablation   -followed outpatient by Dr. Ashley Delaney.    -pacemaker in place   -continue Tikosyn   -can resume Eliquis on POD#7    3. Elevated Cr   -Cr 1.76 in preadmission testing. 1.58 postop. Normal renal function noted back in 2017   -avoid NSAIDS   -f/u with pcp for repeat renal function testing    4. Hypertension, chronic   -BP stable.    -continue Aldactone and Carvedilol    5. Anxiety, chronic   -NAD. continue lexapro and ativan prn    Patient seen and examined with Dr. Ramy House. Discharge To:   Home    Signed By: Bernabe Christine NP

## 2019-06-11 NOTE — PROGRESS NOTES
Primary Nurse Georgiana Gomez RN and Norma Washington RN performed a dual skin assessment on this patient No impairment noted  Fareed score is 21    SURGICAL INCISION ON MID BACK

## 2019-06-11 NOTE — PROGRESS NOTES
OCCUPATIONAL THERAPY EVALUATION WITH DISCHARGE: Spine  Patient: Daylene Severs (35 y.o. male)  Date: 6/11/2019  Primary Diagnosis: Lumbar stenosis with neurogenic claudication [M48.062]  Procedure(s) (LRB):  BILATERAL L4,5 LAMINOTOMIES (N/A) 1 Day Post-Op   Precautions:  Fall, Spinal No bending, no lifting greater than 5 lbs, no twisting, log-roll technique, repositioning every 20-30 min except when sleeping      ASSESSMENT :  Based on the objective data described below, patient presents with Modified independent upper body ADLs, Modified independent lower body ADLs, and Modified independent functional mobility. Patient is POD 1 s/p bilateral L4 and L5 laminotomies and is received sitting up in chair agreeable to participate. He demonstrates good awareness of back precautions, able to recall them without cues. Demonstrates tailor sitting in order to perform lower body dressing tasks with mod I. Able to maintain a straight back during toilet transfer with use of grab bars with mod I. Patient is motivated and reports his wife will be able to assist as needed at home. Education provided regarding ADL adaptations, fall prevention, energy conservation, and home safety with patient indicating understanding of all education provided. Patient is cleared for discharge from OT. No further OT needs. The following are barriers to ADL independence while in acute care:   - Cognitive and/or behavioral: None  - Medical condition: precautions    - Other:       Discharge recommendations: None    Equipment recommendations for successful discharge (if) home: None      PLAN :  Further skilled acute occupational therapy services are not indicated at this time. SUBJECTIVE:   Patient stated Oh I didn't realize I could do that! -in reference to tailor sitting to don/doff socks. RN cleared patient for therapy. Patient agreeable to participate.      OBJECTIVE DATA SUMMARY:   HISTORY:   Past Medical History:   Diagnosis Date    Arthritis     Atrial fibrillation ([facility] [address] 2013    CAD (coronary artery disease)     PT DENIES HX OF CAD    Chronic kidney disease     Chronic pain     Elevated serum creatinine 6/7/2019    GERD (gastroesophageal reflux disease)     Gout     Hypertension     CONTROLLED BY MEDS    IBS (irritable bowel syndrome)     Ill-defined condition 01/2019    cardioversion    Pacemaker     PUD (peptic ulcer disease)     hx of    S/P ablation of atrial fibrillation      Past Surgical History:   Procedure Laterality Date    CARDIAC SURG PROCEDURE UNLIST  2019    cardioversion    COLONOSCOPY N/A 2/13/2019    COLONOSCOPY performed by Toan Pena MD at [address] [place] [address] ENDOSCOPY    HX GI      COLONOSCOPY X 3    HX HEENT      TONSILLECTOMY // PT UNSURE    HX HERNIA REPAIR Left     HX LAP CHOLECYSTECTOMY      HX PACEMAKER  8/21/13     Prior Level of Function/Environment/Context: Patient lives with his wife in a 2 story home (bed/bath on first floor). He works from home as [employer] a [employer]. Drives. Independent with ADLs and mobility. Expanded or extensive additional review of patient history:   Home Situation  Home Environment: Private residence  # Steps to Enter: 3  Rails to Enter: Yes  Hand Rails : Bilateral  One/Two Story Residence: Two story, live on 1st floor  # of Interior Steps: 12  Interior Rails: Right  Support Systems: Family member(s), Spouse/Significant Other/Partner  Patient Expects to be Discharged to[de-identified] Private residence  Current DME Used/Available at Home: None  Tub or Shower Type: Shower    Hand dominance: Right    EXAMINATION OF PERFORMANCE DEFICITS:  Cognitive/Behavioral Status:  Neurologic State: Alert  Orientation Level: Oriented X4  Cognition: Appropriate decision making; Appropriate for age attention/concentration; Appropriate safety awareness; Follows commands  Perception: Appears intact  Perseveration: No perseveration noted  Safety/Judgement: Awareness of environment; Fall prevention;Good awareness of safety precautions; Home safety; Insight into deficits    Skin: observed skin intact. Edema: no abnormal swelling noted. Hearing: Auditory  Auditory Impairment: Hard of hearing, bilateral    Vision/Perceptual:    Tracking: Able to track stimulus in all quadrants w/o difficulty    Diplopia: No    Corrective Lenses: Glasses    Range of Motion:  AROM: Generally decreased, functional In bilateral UEs    Strength:  Strength: Generally decreased, functional In bilateral UEs    Coordination:  Coordination: within functional limits In bilateral UEs  Fine Motor Skills-Upper: Left Intact; Right Intact    Gross Motor Skills-Upper: Left Intact; Right Intact    Balance:  Sitting: Intact  Standing: Intact    Functional Mobility and Transfers for ADLs:  Bed Mobility:  Rolling: Modified independent  Supine to Sit: Modified independent  Sit to Supine: Modified independent  Scooting: Modified independent    Transfers:  Sit to Stand: Modified independent  Stand to Sit: Modified independent  Bed to Chair: Modified independent  Bathroom Mobility: Modified independent  Toilet Transfer : Modified independent(with use of grab bar)    ADL Assessment:  Feeding: Independent    Oral Facial Hygiene/Grooming: Modified Independent    Bathing: Modified independent    Upper Body Dressing: Modified independent    Lower Body Dressing: Modified independent    Toileting: Modified independent    ADL Intervention and task modifications:  Grooming  Grooming Assistance: Modified independent  Washing Hands: Modified independent    Lower Body Dressing Assistance  Pants With Elastic Waist: Modified independent  Socks: Modified independent  Slip on Shoes Without Back: Modified independent  Leg Crossed Method Used: Yes  Position Performed: Seated in chair  Cues: Verbal cues provided    Cognitive Retraining  Safety/Judgement: Awareness of environment; Fall prevention;Good awareness of safety precautions; Home safety; Insight into deficits    Patient instructed and indicated understanding the benefits of maintaining activity tolerance, functional mobility, and independence with self care tasks during acute stay  to ensure safe return home and to baseline. Encouraged patient to increase frequency and duration OOB, not sitting longer than 30 mins without marching/walking with staff, be out of bed for all meals, perform daily ADLs (as approved by RN/MD regarding bathing etc), and performing functional mobility to/from bathroom. Patient instruction and indicated understanding on body mechanics, ergonomics and gravitational force on the spine during different body positions to plan activities in prep for return home to complete basic ADLs, instrumental ADLs and back to work safely. Bathing: Patient instructed and indicated understanding when bathing to not submerge wound in water, stand to shower or sponge bathe, cover wound with plastic and tape to ensure no water reaches bandage/wound without cues. Instructed patient to perform shower transfer (when ready) dry for safety prior to attempting wet for safety and fall prevention. Instructed patient to have supervision from family member/significant other when performing wet shower transfer for safety. Dressing lower body: Patient instructed to don brace first and on the benefits to remain seated to don all clothing to increase independence with precautions and pain management. Patient instructed and demonstrated tailor sitting for lower body dressing with modified independence. Patient instruction and indicated understanding to not strain i.e. holding breath to bear down during a bowel movement, lifting/activity, and sexual activity.      Home safety: Patient instructed and indicated understanding on home modifications and safety [raise height of ADL objects (i.e. clothing, sink items, fridge items, items to mouth when grooming), appropriate height of chair surfaces, recliner safety, change of floor surfaces, clear pathways] to increase independence and fall prevention. Standing: Patient instructed and indicated understanding to walk up to sink/counter top/surfaces, step into walker, square off while using objects, slide objects along surfaces, to increase adherence to back precautions and fall prevention. Patient instructed to increase amount of time standing in order to increase independence and tolerance with ADLs. During prolonged standing, can open cabinet door or place foot on stool to decrease spinal pressure/increase pain. Therapeutic Exercise:  Patient instructed on the benefits shoulder flexion exercises to increase independence with ADLs, active ROM, and strength of spine. Patient instructed and demonstrated techniques of activating abdomen and pelvic floor muscles. Patient instructed and indicated understanding how to progress reps, sets, against gravity to then working up to 5 lbs until surgeon clears for increased weight, supine to sitting and then standing. Can use household items as weights. Functional Measure:  Barthel Index:    Bathin  Bladder: 10  Bowels: 10  Groomin  Dressing: 10  Feeding: 10  Mobility: 15  Stairs: 5  Toilet Use: 10  Transfer (Bed to Chair and Back): 15  Total: 95/100        Percentage of impairment   0%   1-19%   20-39%   40-59%   60-79%   80-99%   100%   Barthel Score 0-100 100 99-80 79-60 59-40 20-39 1-19   0     The Barthel ADL Index: Guidelines  1. The index should be used as a record of what a patient does, not as a record of what a patient could do. 2. The main aim is to establish degree of independence from any help, physical or verbal, however minor and for whatever reason. 3. The need for supervision renders the patient not independent. 4. A patient's performance should be established using the best available evidence.  Asking the patient, friends/relatives and nurses are the usual sources, but direct observation and common sense are also important. However direct testing is not needed. 5. Usually the patient's performance over the preceding 24-48 hours is important, but occasionally longer periods will be relevant. 6. Middle categories imply that the patient supplies over 50 per cent of the effort. 7. Use of aids to be independent is allowed. Katie Larsen., Barthel, D.W. (9704). Functional evaluation: the Barthel Index. [facility] (14)2. Patricia Lopes geno ROSA Ralph Rosabel Means., Yaya Oneil., [place], [facility] (1999). Measuring the change indisability after inpatient rehabilitation; comparison of the responsiveness of the Barthel Index and Functional [place] Measure. Journal of Neurology, Neurosurgery, and Psychiatry, 66(4), 815-690. JAMES Reid, EHSAN Chou, & Leeann Horne M.A. (2004.) Assessment of post-stroke quality of life in cost-effectiveness studies: The usefulness of the Barthel Index and the EuroQoL-5D. Quality of Life Research, 15, 129-51     Occupational Therapy Evaluation Charge Determination   History Examination Decision-Making   LOW Complexity : Brief history review  LOW Complexity : 1-3 performance deficits relating to physical, cognitive , or psychosocial skils that result in activity limitations and / or participation restrictions  LOW Complexity : No comorbidities that affect functional and no verbal or physical assistance needed to complete eval tasks       Based on the above components, the patient evaluation is determined to be of the following complexity level: LOW   Pain:  Patient reporting discomfort in back at end of session. RN present at end of session to manage medication as appropriate. Activity Tolerance:   good and tolerates ADLs without rest breaks  Please refer to the flowsheet for vital signs taken during this treatment.     After treatment patient left:   Up in chair  Call light within reach  Nurse at bedside   COMMUNICATION/EDUCATION:   The patients evaluation was discussed with: Physical Therapist, Registered Nurse and patient.     Thank you for this referral.  Richard Brewster, OT  Time Calculation: 16 mins

## 2019-06-11 NOTE — DISCHARGE SUMMARY
[address] [place] [address]   [address] [id number] [address]  [phone]     Discharge Summary       PATIENT ID: Shawn French  MRN: [id number]   YOB: 1940    DATE OF ADMISSION: 6/10/2019  5:28 AM    DATE OF DISCHARGE: 6/11/2019   PRIMARY CARE PROVIDER: Mack Baumgarten, MD     CONSULTATIONS: None    PROCEDURES/SURGERIES: Procedure(s):  BILATERAL L4,5 LAMINOTOMIES    History of Present Illness: Shawn French is a 70 y.o. male with prior medical history notable for Atrial fibrillation s/p pacemaker placement, CKD, anxiety, PTSD, CAD, GERD, gout, and lumbar stenosis. He has severe left L5 radiculopathy with significant limitations in standing or walking tolerance. Workup revealed focal high grade left lateral recess stenosis at L4-5 on left. After failing conservative therapy and a discussion of the risks, benefits, alternatives, perioperative course, and potential complications of surgery, he consented to undergo a Procedure(s):  BILATERAL L4,5 LAMINOTOMIES. Hospital Course:  Shawn French tolerated the procedure well. He was transferred  to the recovery room in stable condition. After a brief stay the patient was then transferred to the Spinal Surgery Unit at [address] [place] [address].  On postoperative day #1, the dressing was clean and dry, he was neurovascularly intact. The patient was afebrile and vital signs were stable. Calves were soft and non-tender bilaterally. The patient was tolerating a regular diet, voiding, and making great progress with physical therapy. Hemoglobin prior to discharge were   Lab Results   Component Value Date/Time    HGB 12.3 06/06/2019 03:35 PM        Shawn French made satisfactory progress with physical therapy and was discharged to Home in stable condition on postoperative day 1.   He was provided with routine postoperative instructions and advised to follow up with  Linwood Vidales MD JAIMIE in 2 weeks following discharge from the hospital.    He was instructed to resume his Eliquis on 6/17/2019. FOLLOW UP APPOINTMENTS:    Follow-up Information     Follow up With Specialties Details Why Contact Info    Steph Jarrell MD [address] [place]  [phone]             DISCHARGE MEDICATIONS:  Discharge Medication List as of 6/11/2019 12:43 PM      START taking these medications    Details   oxyCODONE IR (ROXICODONE) 5 mg immediate release tablet Take 1 Tab by mouth every four (4) hours as needed for Pain for up to 7 days. Max Daily Amount: 30 mg., Print, Disp-40 Tab, R-0      polyethylene glycol (MIRALAX) 17 gram packet Take 1 Packet by mouth daily. , Normal, Disp-30 Packet, R-0      naloxone (NARCAN) 4 mg/actuation nasal spray Use 1 spray intranasally, then discard. Repeat with new spray every 2 min as needed for opioid overdose symptoms, alternating nostrils. Indications: Decrease in Rate & Depth of Breathing due to Opioid Drug, Print, Disp-1 Each, R-0         CONTINUE these medications which have NOT CHANGED    Details   escitalopram oxalate (LEXAPRO) 10 mg tablet Take 10 mg by mouth nightly., Historical Med      spironolactone (ALDACTONE) 25 mg tablet Take 25 mg by mouth daily. , Historical Med      dofetilide (TIKOSYN) 250 mcg capsule Take 250 mcg by mouth two (2) times a day., Historical Med      carvedilol (COREG) 3.125 mg tablet Take 3.125 mg by mouth two (2) times daily (with meals). , Historical Med      omeprazole (PRILOSEC) 40 mg capsule Take 40 mg by mouth nightly., Historical Med      LORazepam (ATIVAN) 0.5 mg tablet Take 0.5 mg by mouth every eight (8) hours as needed for Anxiety. , Historical Med      apixaban (ELIQUIS) 5 mg tablet Take 5 mg by mouth two (2) times a day., Historical Med      simvastatin (ZOCOR) 20 mg tablet Take 20 mg by mouth nightly., Historical Med             PHYSICAL EXAMINATION AT DISCHARGE:  General: Pleasant, alert, cooperative, no distress. Resp: Equal chest expansion. No accessory muscle use. Gastrointestinal:  Soft, non-tender, non-distended. Neurological: Follows commands. CAMEJO. Speech clear. Sensation intact to light touch. Motor: unchanged L2-S1. Musculoskeletal:  Calves soft, non-tender upon palpation or with passive stretch. Skin:  Incision - clean, dry and intact. No significant erythema or swelling. CHRONIC MEDICAL DIAGNOSES:  Problem List as of 6/11/2019 Date Reviewed: 2/13/2019          Codes Class Noted - Resolved    Chronic kidney disease (Chronic) ICD-10-CM: N18.9  ICD-9-CM: 520. 9  Unknown - Present        Lumbar stenosis with neurogenic claudication ICD-10-CM: M48.062  ICD-9-CM: 724.03  6/10/2019 - Present        Elevated serum creatinine ICD-10-CM: R79.89  ICD-9-CM: 790.99  6/7/2019 - Present        Altered mental status ICD-10-CM: R41.82  ICD-9-CM: 780.97  5/3/2016 - Present        Gallbladder polyp ICD-10-CM: K82.4  ICD-9-CM: 575.6  3/4/2015 - Present              Signed:   Raul José NP  6/11/2019  4:32 PM

## 2019-06-11 NOTE — PROGRESS NOTES
physical Therapy EVALUATION/DISCHARGE  Patient: Desiree Brarera (26 y.o. male)  Date: 6/11/2019  Primary Diagnosis: Lumbar stenosis with neurogenic claudication [M48.062]  Procedure(s) (LRB):  BILATERAL L4,5 LAMINOTOMIES (N/A) 1 Day Post-Op   Precautions:   Fall, Spinal  ASSESSMENT :  Based on the objective data described below, the patient presents with near baseline level of function at this time. At baseline patient is independent and active. Instructed in back precautions to include log roll and sitting restriction. Needing SBA for all bed mobility and transfers. Amb approx 300 feet with no overt LOB and good dhaval. Up/down 4 stairs with 1 rail and SBA with no difficulty noted. Do not anticipate patient will need any HH PT at this time. No further skilled PT need and patient is safe to amb on own in room. Patient is cleared for discharge from PT standpoint:  YES [x]     NO []     Further skilled acute physical therapy is not indicated at this time. PLAN :  Discharge Recommendations: None  Further Equipment Recommendations for Discharge: none     SUBJECTIVE:   Patient stated I have always been pretty active.     OBJECTIVE DATA SUMMARY:   HISTORY:    Past Medical History:   Diagnosis Date    Arthritis     Atrial fibrillation ([facility] [address] 2013    CAD (coronary artery disease)     PT DENIES HX OF CAD    Chronic pain     Elevated serum creatinine 6/7/2019    GERD (gastroesophageal reflux disease)     Gout     Hypertension     CONTROLLED BY MEDS    IBS (irritable bowel syndrome)     Ill-defined condition 01/2019    cardioversion    Pacemaker     PUD (peptic ulcer disease)     hx of    S/P ablation of atrial fibrillation      Past Surgical History:   Procedure Laterality Date    CARDIAC SURG PROCEDURE UNLIST  2019    cardioversion    COLONOSCOPY N/A 2/13/2019    [facility]COPY    HX GI      COLONOSCOPY X 3    HX HEENT      TONSILLECTOMY // PT UNSURE    HX HERNIA REPAIR Left     HX LAP CHOLECYSTECTOMY      HX PACEMAKER  8/21/13     Prior Level of Function/Home Situation: Independent with mobility at baseline  Personal factors and/or comorbidities impacting plan of care:     Home Situation  Home Environment: Private residence  # Steps to Enter: 3  Rails to Enter: Yes  Hand Rails : Bilateral  One/Two Story Residence: Two story, live on 1st floor  # of Interior Steps: 12  Interior Rails: Right  Support Systems: Family member(s), Spouse/Significant Other/Partner  Patient Expects to be Discharged to[de-identified] Private residence  Current DME Used/Available at Home: None  Tub or Shower Type: Shower    EXAMINATION/PRESENTATION/DECISION MAKING:   Critical Behavior:  Neurologic State: Alert  Orientation Level: Oriented X4          Range Of Motion:  AROM: Generally decreased, functional                       Strength:    Strength: Generally decreased, functional     Functional Mobility:  Bed Mobility:  Rolling: Modified independent  Supine to Sit: Modified independent  Sit to Supine: Modified independent  Scooting: Modified independent  Transfers:  Sit to Stand: Modified independent  Stand to Sit: Modified independent                       Balance:   Sitting: Intact  Standing: Intact  Ambulation/Gait Training:  Distance (ft): 300 Feet (ft)  Assistive Device: Gait belt  Ambulation - Level of Assistance: Stand-by assistance;Assist x1     Gait Description (WDL): Exceptions to WDL  Gait Abnormalities: Decreased step clearance;Shuffling gait        Base of Support: Narrowed     Speed/Erlinda: Pace decreased (<100 feet/min); Shuffled; Slow  Step Length: Left shortened;Right shortened        Stairs:  Number of Stairs Trained: 4  Stairs - Level of Assistance: Supervision   Rail Use: Left         Pain:  Pain Scale 1: Numeric (0 - 10)  Pain Intensity 1: 0  Pain Location 1: Back  Activity Tolerance:   VSS  Please refer to the flowsheet for vital signs taken during this treatment. After treatment:   [x]   Patient left in no apparent distress sitting up in chair  []   Patient left in no apparent distress in bed  [x]   Call bell left within reach  [x]   Nursing notified  []   Caregiver present  []   Bed alarm activated    COMMUNICATION/EDUCATION:   Communication/Collaboration:  []   Fall prevention education was provided and the patient/caregiver indicated understanding. []   Patient/family have participated as able and agree with findings and recommendations. []   Patient is unable to participate in plan of care at this time.   Findings and recommendations were discussed with: Physical Therapist, Occupational Therapist and Registered Nurse    Thank you for this referral.  Nora Hernandez, PT, DPT   Time Calculation: 11 mins

## 2019-06-11 NOTE — DISCHARGE INSTRUCTIONS
After Hospital Care Plan:  Discharge Instructions Lumbar Laminectomy Surgery Dr. Cherelle Bowman     Patient Name: Meera Wagner    Date of procedure: 6/10/2019      Date of discharge: 6/11/2019    Procedure: Procedure(s):  BILATERAL L4,5 LAMINOTOMIES      PCP: Luis Duffy MD     **You can resume your Eliquis on 6/17    Follow up appointments  -Follow up with Dr. Cherelle Bowman in 2 weeks. Call [phone] to make an appointment as soon as you get home from the hospital.    When to call your Spine Surgeon:  -Signs of infection-if your incision is red; continues to have drainage; drainage has a foul odor or if you have a persistent fever over 101 degrees for 24 hours  -Nausea or vomiting, severe headache  -Loss of bowel or bladder function, inability to urinate  -Changes in sensation in your arms or legs (numbness, tingling, loss of color)  -Increased weakness-greater than before your surgery  -Severe pain or pain not relieved by medications  -Signs of a blood clot in your leg-calf pain, tenderness, redness, swelling of lower leg    When to call your Primary Care Physician:  -Concerns about medical conditions such as diabetes, high blood pressure, asthma, congestive heart failure  -Call if blood sugars are elevated, persistent headache or dizziness, coughing or congestion, constipation or diarrhea, burning with urination, abnormal heart rate    When to call 911 and go to the nearest emergency room:  -Acute onset of chest pain, shortness of breath, difficulty breathing    Activity  - You are going home a well person, be as active as possible. Your only exercise should be walking. Start with short frequent walks and increase your walking distance each day.  -Limit the amount of time you sit to 20-30 minute intervals.   Sitting for prolonged periods of time will be uncomfortable for you following surgery.  -Do NOT lift anything over 5 pounds  -Do NOT do any straining, twisting or bending  -When you are in bed, you may lay on your back or on either side. Do NOT lie on your stomach    Brace  -Not all patients require a brace, your physician or their assistant will advise on whether you need a brace prior to discharge. -If you have a back brace, you should wear your brace at all times when you are out of bed. Do not wear the brace while in bed or showering.  -Remember to always wear a cotton t-shirt underneath your brace.  -Do not bend or twist when your brace is off    Diet  -Resume usual diet; drink plenty of fluids; eat foods high in fiber  -It is important to have regular bowel movements. Pain medications may cause constipation. You may want to take a stool softener (such as Senokot-S or Colace) to prevent constipation.  -If constipation occurs, take a laxative (such as Dulcolax tablets, Milk of Magnesia, or a suppository). Laxatives should only be used if the above preventable measures have failed and you still have not had a bowel movement after three days    Driving  -You may not drive or return to work until instructed by your physician. However, you may ride in the car for short periods of time. Incision Care  -You may take brief showers but do not run the water run directly onto the wound. After showering or bathing, remove the wet dressing and gently blot the wound dry with a soft towel.  -Do not rub or apply any lotions or ointments to your incision site.   -Do not soak or scrub your wound  -Keep a dry dressing (ABD and paper tape) on your incision and have it changed daily for 14 days after surgery; more often if your incision is draining. Have your caregiver wash their hands thoroughly before changing your dressing.  -You will have absorbable sutures and steristrips (white tape) on your incision. Leave the steristrips on until they fall off. Showering  -You may shower in approximately 2 days after your surgery.    -Leave the dressing on during your shower.   Do NOT allow the water to run directly onto your dressing. Once you get out of the shower, put on a dry dressing.  -Reminder- your brace can be removed while showering. Remember to not bend or twist while your brace is off.    -Do not take a tub bath. Preventing blood clots  -You have been given T.E.D. stockings to wear. Continue to wear these for 7 days after your discharge. Put them on in the morning and take them off at night.    -They are used to increase your circulation and prevent blood clots from forming in your legs  -T. E.D. stockings can be machine washed, temperature not to exceed 160° F (71°C) and machine dried for 15 to 20 minutes, temperature not to exceed 250° F (121°C). Pain management  -Take pain medication as prescribed; decrease the amount you use as your pain lessens  -Do not wait until you are in extreme pain to take your medication.  -Avoid alcoholic beverages while taking pain medication    Pain Medication Safety  DO:  -Read the Medication Guide   -Take your medicine exactly as prescribed   -Store your medicine away from children and in a safe place   -Flush unused medicine down the toilet   -Call your healthcare provider for medical advice about side effects. You may report side effects to FDA at 0-924-FDA-5081.   -Please be aware that many medications contain Tylenol. We do not want you to over medicate so please read the information below as a guide. Do not take more than 4 Grams of Tylenol in a 24 hour period.   (There are 1000 milligrams in one Gram)                                                                                                                                                                                                                                                                                      Percocet contains 325 mg of Tylenol per tablet (do not take more than 12 tablets in 24 hours)  Lortab contains 500 mg of Tylenol per tablet (do not take more than 8 tablets in 24 hours)  Norco contains 325 mg of Tylenol per tablet (do not take more than 12 tablets in 24 hours). DO NOT:  -Do not give your medicine to others   -Do not take medicine unless it was prescribed for you   -Do not stop taking your medicine without talking to your healthcare provider   -Do not break, chew, crush, dissolve, or inject your medicine. If you cannot  swallow your medicine whole, talk to your healthcare provider.  -Do not drink alcohol while taking this medicine  -Do not take anti-inflammatory medications or aspirin unless instructed by your  physician.

## 2019-06-11 NOTE — PROGRESS NOTES
Observation notice provided in writing to patient and/or caregiver as well as verbal explanation of the policy. Patients who are in outpatient status also receive the Observation notice.     Jonnathan Mercado, [facility]

## 2019-06-15 NOTE — OP NOTES
[address]   OPERATIVE REPORT    Name:  Jeison Kim  MR#:  [id number]  :  1940  ACCOUNT #:  [de-identified]  DATE OF SERVICE:  06/10/2019    PREOPERATIVE DIAGNOSIS:  Lumbar stenosis, L4-5. POSTOPERATIVE DIAGNOSIS:  Lumbar stenosis, L4-5. PROCEDURE PERFORMED:  Bilateral L4-5 laminotomies with medial facetectomies, use of operating microscope. SURGEON:  Antony Bruce MD    ASSISTANT:  Jimmy Kaur. ANESTHESIA:  General endotracheal anesthesia. COMPLICATIONS:  None. SPECIMENS REMOVED:  None. IMPLANTS:  None. ESTIMATED BLOOD LOSS:  25 mL. OPERATIVE INDICATIONS:  This is a 70-year-old gentleman with history of progressive radiculopathy worse on the left. MRI showed stenosis at L4-5 with a mild nonmobile listhesis. After discussing treatment options and risks of surgery, informed consent was obtained. OPERATION IN DETAIL:  The patient was taken to the operating room and placed under general endotracheal anesthesia. All necessary lines and monitors were placed. He was given appropriate dose of IV antibiotics. SCDs were placed. He was placed prone on the Jose Ramon frame. All pressure points were padded. The lumbosacral region was prepped and draped in standard sterile fashion. A midline incision was made over L4-5 with a skin knife and carried down with Bovie electrocautery in the avascular midline plane. Subperiosteal dissection was performed on the lamina of L4 and L5 and  medial facets were dissected out. Fluoroscopy was used for localization. A self-retaining retractor was placed . The high-speed drill was used to perform a laminotomy and medial facetectomy on the left. Operating scope was brought into field. Curettes and Kerrisons were used to decompress the lateral thecal sac in the L5 nerve root. There was marked spinal compression of the L5 nerve root from facet arthropathy and hypertrophic facet compressing the L5 nerve root.   This was widely skeletonized going out the L5 foramen. I palpated and visualized the disk space. There was little bulge, but no herniated disk. The ball-ended probe was passed out the foramen. Hemostasis was achieved. I then worked on the right side, where a little bit more limited laminotomy was performed, and I widely decompressed the spinal sac and the right L5 nerve root in a similar fashion. The wound was then copiously irrigated with antibiotic solution. Hemostasis was achieved. Retractors were removed. Scope was taken out of the field. The wound was then closed using 0 Vicryl to close the deep fascial layer, 2-0 Vicryl in the deep dermal layer, running 4-0 Monocryl in a subcuticular fashion. The wounds were cleaned, dried, and dressed with sterile dressing. The patient was flipped over, extubated, and taken to recovery room in stable condition.         MD KATHY [email or web address]  D:  06/14/2019 10:44  T:  06/14/2019 10:55  JOB #:  [id number]  CC:  Power Hair MD

## 2019-12-17 ENCOUNTER — HOSPITAL ENCOUNTER (OUTPATIENT)
Dept: GENERAL RADIOLOGY | Age: 79
Discharge: HOME | End: 2019-12-17
Attending: UROLOGY
Payer: MEDICARE

## 2019-12-17 ENCOUNTER — HOSPITAL ENCOUNTER (OUTPATIENT)
Dept: PREADMISSION TESTING | Age: 79
Discharge: HOME | End: 2019-12-17
Payer: MEDICARE

## 2019-12-17 VITALS
RESPIRATION RATE: 18 BRPM | OXYGEN SATURATION: 96 % | WEIGHT: 171.08 LBS | SYSTOLIC BLOOD PRESSURE: 102 MMHG | TEMPERATURE: 97.4 F | HEIGHT: 70 IN | DIASTOLIC BLOOD PRESSURE: 64 MMHG | BODY MASS INDEX: 24.49 KG/M2 | HEART RATE: 73 BPM

## 2019-12-17 LAB
ABO + RH BLD: NORMAL
ANION GAP SERPL CALC-SCNC: 5 MMOL/L (ref 5–15)
APPEARANCE UR: CLEAR
ATRIAL RATE: 78 BPM
BACTERIA URNS QL MICRO: NEGATIVE /HPF
BILIRUB UR QL: NEGATIVE
BLOOD GROUP ANTIBODIES SERPL: NORMAL
BUN SERPL-MCNC: 41 MG/DL (ref 6–20)
BUN/CREAT SERPL: 25 (ref 12–20)
CALCIUM SERPL-MCNC: 9.6 MG/DL (ref 8.5–10.1)
CALCULATED R AXIS, ECG10: -79 DEGREES
CALCULATED T AXIS, ECG11: 90 DEGREES
CHLORIDE SERPL-SCNC: 104 MMOL/L (ref 97–108)
CO2 SERPL-SCNC: 30 MMOL/L (ref 21–32)
COLOR UR: ABNORMAL
CREAT SERPL-MCNC: 1.64 MG/DL (ref 0.7–1.3)
DIAGNOSIS, 93000: NORMAL
EPITH CASTS URNS QL MICRO: ABNORMAL /LPF
ERYTHROCYTE [DISTWIDTH] IN BLOOD BY AUTOMATED COUNT: 27.1 % (ref 11.5–14.5)
GLUCOSE SERPL-MCNC: 114 MG/DL (ref 65–100)
GLUCOSE UR STRIP.AUTO-MCNC: NEGATIVE MG/DL
HCT VFR BLD AUTO: 41.2 % (ref 36.6–50.3)
HGB BLD-MCNC: 12 G/DL (ref 12.1–17)
HGB UR QL STRIP: ABNORMAL
KETONES UR QL STRIP.AUTO: NEGATIVE MG/DL
LEUKOCYTE ESTERASE UR QL STRIP.AUTO: NEGATIVE
MCH RBC QN AUTO: 20.9 PG (ref 26–34)
MCHC RBC AUTO-ENTMCNC: 29.1 G/DL (ref 30–36.5)
MCV RBC AUTO: 71.7 FL (ref 80–99)
NITRITE UR QL STRIP.AUTO: NEGATIVE
NRBC # BLD: 0 K/UL (ref 0–0.01)
NRBC BLD-RTO: 0 PER 100 WBC
PH UR STRIP: 6 [PH] (ref 5–8)
PLATELET # BLD AUTO: 351 K/UL (ref 150–400)
PMV BLD AUTO: 10.1 FL (ref 8.9–12.9)
POTASSIUM SERPL-SCNC: 4 MMOL/L (ref 3.5–5.1)
PROT UR STRIP-MCNC: ABNORMAL MG/DL
Q-T INTERVAL, ECG07: 490 MS
QRS DURATION, ECG06: 152 MS
QTC CALCULATION (BEZET), ECG08: 539 MS
RBC # BLD AUTO: 5.75 M/UL (ref 4.1–5.7)
RBC #/AREA URNS HPF: ABNORMAL /HPF (ref 0–5)
SODIUM SERPL-SCNC: 139 MMOL/L (ref 136–145)
SP GR UR REFRACTOMETRY: 1.02 (ref 1–1.03)
SPECIMEN EXP DATE BLD: NORMAL
UA: UC IF INDICATED,UAUC: ABNORMAL
UROBILINOGEN UR QL STRIP.AUTO: 0.2 EU/DL (ref 0.2–1)
VENTRICULAR RATE, ECG03: 73 BPM
WBC # BLD AUTO: 8.9 K/UL (ref 4.1–11.1)
WBC URNS QL MICRO: ABNORMAL /HPF (ref 0–4)

## 2019-12-17 PROCEDURE — 80048 BASIC METABOLIC PNL TOTAL CA: CPT

## 2019-12-17 PROCEDURE — 85027 COMPLETE CBC AUTOMATED: CPT

## 2019-12-17 PROCEDURE — 86900 BLOOD TYPING SEROLOGIC ABO: CPT

## 2019-12-17 PROCEDURE — 71046 X-RAY EXAM CHEST 2 VIEWS: CPT

## 2019-12-17 PROCEDURE — 81001 URINALYSIS AUTO W/SCOPE: CPT

## 2019-12-17 PROCEDURE — 93005 ELECTROCARDIOGRAM TRACING: CPT

## 2019-12-17 PROCEDURE — 36415 COLL VENOUS BLD VENIPUNCTURE: CPT

## 2019-12-17 RX ORDER — SODIUM CHLORIDE, SODIUM LACTATE, POTASSIUM CHLORIDE, CALCIUM CHLORIDE 600; 310; 30; 20 MG/100ML; MG/100ML; MG/100ML; MG/100ML
25 INJECTION, SOLUTION INTRAVENOUS ONCE
Status: CANCELLED | OUTPATIENT
Start: 2019-12-26 | End: 2019-12-26

## 2019-12-17 RX ORDER — FUROSEMIDE 20 MG/1
40 TABLET ORAL DAILY
COMMUNITY

## 2019-12-17 RX ORDER — DOCUSATE SODIUM 250 MG
250 CAPSULE ORAL DAILY
COMMUNITY

## 2019-12-17 NOTE — PERIOP NOTES
[facility]  Preoperative Instructions        Surgery Date 12/26/2019          Time of Arrival 0830am  Contact# [phone]    1. On the day of your surgery, please report to the Surgical Services Registration Desk and sign in at your designated time. The Surgery Center is located to the right of the Emergency Room. 2. You must have someone with you to drive you home. You should not drive a car for 24 hours following surgery. Please make arrangements for a friend or family member to stay with you for the first 24 hours after your surgery. 3. Do not have anything to eat or drink (including water, gum, mints, coffee, juice) after midnight  12/25/2019  . ? This may not apply to medications prescribed by your physician. ?(Please note below the special instructions with medications to take the morning of your procedure.)    4. We recommend you do not drink any alcoholic beverages for 24 hours before and after your surgery. 5. Contact your surgeons office for instructions on the following medications: non-steroidal anti-inflammatory drugs (i.e. Advil, Aleve), vitamins, and supplements. (Some surgeons will want you to stop these medications prior to surgery and others may allow you to take them)  **If you are currently taking Plavix, Coumadin, Aspirin and/or other blood-thinning agents, contact your surgeon for instructions. ** Your surgeon will partner with the physician prescribing these medications to determine if it is safe to stop or if you need to continue taking. Please do not stop taking these medications without instructions from your surgeon    6. Wear comfortable clothes. Wear glasses instead of contacts. Do not bring any money or jewelry. Please bring picture ID, insurance card, and any prearranged co-payment or hospital payment. Do not wear make-up, particularly mascara the morning of your surgery.   Do not wear nail polish, particularly if you are having foot /hand surgery. Wear your hair loose or down, no ponytails, buns, susan pins or clips. All body piercings must be removed. Please shower with antibacterial soap for three consecutive days before and on the morning of surgery, but do not apply any lotions, powders or deodorants after the shower on the day of surgery. Please use a fresh towels after each shower. Please sleep in clean clothes and change bed linens the night before surgery. Please do not shave for 48 hours prior to surgery. Shaving of the face is acceptable. 7. You should understand that if you do not follow these instructions your surgery may be cancelled. If your physical condition changes (I.e. fever, cold or flu) please contact your surgeon as soon as possible. 8. It is important that you be on time. If a situation occurs where you may be late, please call [phone] (OR Holding Area). 9. If you have any questions and or problems, please call [phone] (Pre-admission Testing). 10. Your surgery time may be subject to change. You will receive a phone call the evening prior if your time changes. 11.  If having outpatient surgery, you must have someone to drive you here, stay with you during the duration of your stay, and to drive you home at time of discharge. 12.   In an effort to improve the efficiency, privacy, and safety for all of our Pre-op patients visitors are not allowed in the Holding area. Once you arrive and are registered your family/visitors will be asked to remain in the waiting room. The Pre-op staff will get you from the Surgical Waiting Area and will explain to you and your family/visitors that the Pre-op phase is beginning. The staff will answer any questions and provide instructions for tracking of the patient, by use of the existing tracking number and color-coded status board in the waiting room.   At this time the staff will also ask for your designated spokesperson information in the event that the physician or staff need to provide an update or obtain any pertinent information. The designated spokesperson will be notified if the physician needs to speak to family during the pre-operative phase. If at any time your family/visitors has questions or concerns they may approach the volunteer desk in the waiting area for assistance. Special Instructions: Use incentive spirometer 20x daily and bring with you to hospital.    [address] WITH A SIP OF WATER:Coreg, dofetilide,furosemide,lorazepam if needed. I understand a pre-operative phone call will be made to verify my surgery time. In the event that I am not available, I give permission for a message to be left on my answering service and/or with another person?   yes         ___________________      __________   _________    (Signature of Patient)             (Witness)                (Date and Time)

## 2019-12-17 NOTE — PERIOP NOTES
Incentive Spirometer        Using the incentive spirometer helps expand the small air sacs of your lungs, helps you breathe deeply, and helps improve your lung function. Use your incentive spirometer twice a day (10 breaths each time) prior to surgery. How to Use Your Incentive Spirometer:  1. Hold the incentive spirometer in an upright position. 2. Breathe out as usual.   3. Place the mouthpiece in your mouth and seal your lips tightly around it. 4. Take a deep breath. Breathe in slowly and as deeply as possible. Keep the blue flow rate guide between the arrows. 5. Hold your breath as long as possible. Then exhale slowly and allow the piston to fall to the bottom of the column. 6. Rest for a few seconds and repeat steps one through five at least 10 times. PAT Tidal Volume____2500______  x______2________  Date______12/17/2019_______    Orosco Deming THE INCENTIVE SPIROMETER WITH YOU TO THE HOSPITAL ON THE DAY OF YOUR SURGERY. Opportunity given to ask and answer questions as well as to observe return demonstration.     Patient signature_____________________________    Witness____________________________

## 2019-12-20 NOTE — PERIOP NOTES
Phone call made to Dr Sherwin Diez office to inquire about Pacemaker device form for patient. Form was originally faxed 12/17/19. Office requested us to re fax pacemaker form.      0040 Fax confirmed

## 2019-12-20 NOTE — PERIOP NOTES
Faxed request for medical records from Dr Oscar Dinero (cardiologist) for last office notes, EKG, and CXR. Fax confirmed. Faxed request for medical records from Dr Kristine Rust (nephrologist) for last notes and labs. Fax confirmed.

## 2019-12-25 ENCOUNTER — ANESTHESIA EVENT (OUTPATIENT)
Dept: SURGERY | Age: 79
End: 2019-12-25
Payer: MEDICARE

## 2019-12-26 ENCOUNTER — APPOINTMENT (OUTPATIENT)
Dept: GENERAL RADIOLOGY | Age: 79
End: 2019-12-26
Attending: UROLOGY
Payer: MEDICARE

## 2019-12-26 ENCOUNTER — ANESTHESIA (OUTPATIENT)
Dept: SURGERY | Age: 79
End: 2019-12-26
Payer: MEDICARE

## 2019-12-26 ENCOUNTER — HOSPITAL ENCOUNTER (OUTPATIENT)
Age: 79
Setting detail: OBSERVATION
Discharge: HOME | End: 2019-12-27
Attending: UROLOGY | Admitting: UROLOGY
Payer: MEDICARE

## 2019-12-26 DIAGNOSIS — D49.4 BLADDER TUMOR: Primary | ICD-10-CM

## 2019-12-26 PROCEDURE — 77010033678 HC OXYGEN DAILY

## 2019-12-26 PROCEDURE — 77030013079 HC BLNKT BAIR HGGR 3M -A: Performed by: NURSE ANESTHETIST, CERTIFIED REGISTERED

## 2019-12-26 PROCEDURE — 77030018836 HC SOL IRR NACL ICUM -A: Performed by: UROLOGY

## 2019-12-26 PROCEDURE — 77030028157 HC ELECTRD BPLR BLDR RWOL -B: Performed by: UROLOGY

## 2019-12-26 PROCEDURE — 74011250636 HC RX REV CODE- 250/636: Performed by: UROLOGY

## 2019-12-26 PROCEDURE — 74011250637 HC RX REV CODE- 250/637: Performed by: UROLOGY

## 2019-12-26 PROCEDURE — 88307 TISSUE EXAM BY PATHOLOGIST: CPT

## 2019-12-26 PROCEDURE — 77030018846 HC SOL IRR STRL H20 ICUM -A: Performed by: UROLOGY

## 2019-12-26 PROCEDURE — 77030018832 HC SOL IRR H20 ICUM -A: Performed by: UROLOGY

## 2019-12-26 PROCEDURE — 74011000250 HC RX REV CODE- 250: Performed by: UROLOGY

## 2019-12-26 PROCEDURE — 77030008684 HC TU ET CUF COVD -B: Performed by: NURSE ANESTHETIST, CERTIFIED REGISTERED

## 2019-12-26 PROCEDURE — 76060000034 HC ANESTHESIA 1.5 TO 2 HR: Performed by: UROLOGY

## 2019-12-26 PROCEDURE — 77030040361 HC SLV COMPR DVT MDII -B: Performed by: UROLOGY

## 2019-12-26 PROCEDURE — 77030034696 HC CATH URETH FOL 2W BARD -A: Performed by: UROLOGY

## 2019-12-26 PROCEDURE — 77030027138 HC INCENT SPIROMETER -A

## 2019-12-26 PROCEDURE — C1758 CATHETER, URETERAL: HCPCS | Performed by: UROLOGY

## 2019-12-26 PROCEDURE — 74011250636 HC RX REV CODE- 250/636: Performed by: ANESTHESIOLOGY

## 2019-12-26 PROCEDURE — 77030021707 HC SET IRR FLD WRMR 3M -B: Performed by: UROLOGY

## 2019-12-26 PROCEDURE — 99218 HC RM OBSERVATION: CPT

## 2019-12-26 PROCEDURE — 77030005541 HC CATH URETH FOL44 MDII -B: Performed by: UROLOGY

## 2019-12-26 PROCEDURE — 76210000016 HC OR PH I REC 1 TO 1.5 HR: Performed by: UROLOGY

## 2019-12-26 PROCEDURE — 74011636320 HC RX REV CODE- 636/320: Performed by: UROLOGY

## 2019-12-26 PROCEDURE — 77030040831 HC BAG URINE DRNG MDII -A: Performed by: UROLOGY

## 2019-12-26 PROCEDURE — 77030012961 HC IRR KT CYSTO/TUR ICUM -A: Performed by: UROLOGY

## 2019-12-26 PROCEDURE — 77030026438 HC STYL ET INTUB CARD -A: Performed by: NURSE ANESTHETIST, CERTIFIED REGISTERED

## 2019-12-26 PROCEDURE — 74011000250 HC RX REV CODE- 250: Performed by: NURSE ANESTHETIST, CERTIFIED REGISTERED

## 2019-12-26 PROCEDURE — 77030021163 HC TUBE CYSTO IRR ICUM -A: Performed by: UROLOGY

## 2019-12-26 PROCEDURE — 76010000153 HC OR TIME 1.5 TO 2 HR: Performed by: UROLOGY

## 2019-12-26 PROCEDURE — 74011250636 HC RX REV CODE- 250/636: Performed by: NURSE ANESTHETIST, CERTIFIED REGISTERED

## 2019-12-26 PROCEDURE — 77030018836 HC SOL IRR NACL ICUM -A

## 2019-12-26 PROCEDURE — 77030011640 HC PAD GRND REM COVD -A: Performed by: UROLOGY

## 2019-12-26 RX ORDER — LIDOCAINE HYDROCHLORIDE 10 MG/ML
0.1 INJECTION, SOLUTION EPIDURAL; INFILTRATION; INTRACAUDAL; PERINEURAL AS NEEDED
Status: DISCONTINUED | OUTPATIENT
Start: 2019-12-26 | End: 2019-12-26 | Stop reason: HOSPADM

## 2019-12-26 RX ORDER — CARVEDILOL 6.25 MG/1
3.12 TABLET ORAL 2 TIMES DAILY WITH MEALS
Status: DISCONTINUED | OUTPATIENT
Start: 2019-12-26 | End: 2019-12-27 | Stop reason: HOSPADM

## 2019-12-26 RX ORDER — SODIUM CHLORIDE 0.9 % (FLUSH) 0.9 %
5-40 SYRINGE (ML) INJECTION EVERY 8 HOURS
Status: DISCONTINUED | OUTPATIENT
Start: 2019-12-26 | End: 2019-12-26 | Stop reason: HOSPADM

## 2019-12-26 RX ORDER — SODIUM CHLORIDE 0.9 % (FLUSH) 0.9 %
5-40 SYRINGE (ML) INJECTION EVERY 8 HOURS
Status: DISCONTINUED | OUTPATIENT
Start: 2019-12-26 | End: 2019-12-27 | Stop reason: HOSPADM

## 2019-12-26 RX ORDER — MORPHINE SULFATE 10 MG/ML
2 INJECTION, SOLUTION INTRAMUSCULAR; INTRAVENOUS
Status: DISCONTINUED | OUTPATIENT
Start: 2019-12-26 | End: 2019-12-26 | Stop reason: HOSPADM

## 2019-12-26 RX ORDER — ATROPA BELLADONNA AND OPIUM 16.2; 3 MG/1; MG/1
1 SUPPOSITORY RECTAL
Status: DISCONTINUED | OUTPATIENT
Start: 2019-12-26 | End: 2019-12-27 | Stop reason: HOSPADM

## 2019-12-26 RX ORDER — ADHESIVE BANDAGE
30 BANDAGE TOPICAL DAILY PRN
Status: DISCONTINUED | OUTPATIENT
Start: 2019-12-26 | End: 2019-12-27 | Stop reason: HOSPADM

## 2019-12-26 RX ORDER — PRAVASTATIN SODIUM 40 MG/1
40 TABLET ORAL
Status: DISCONTINUED | OUTPATIENT
Start: 2019-12-26 | End: 2019-12-27 | Stop reason: HOSPADM

## 2019-12-26 RX ORDER — SODIUM CHLORIDE, SODIUM LACTATE, POTASSIUM CHLORIDE, CALCIUM CHLORIDE 600; 310; 30; 20 MG/100ML; MG/100ML; MG/100ML; MG/100ML
125 INJECTION, SOLUTION INTRAVENOUS CONTINUOUS
Status: DISCONTINUED | OUTPATIENT
Start: 2019-12-26 | End: 2019-12-26 | Stop reason: HOSPADM

## 2019-12-26 RX ORDER — DOCUSATE SODIUM 100 MG/1
200 CAPSULE, LIQUID FILLED ORAL DAILY
Status: DISCONTINUED | OUTPATIENT
Start: 2019-12-27 | End: 2019-12-27 | Stop reason: HOSPADM

## 2019-12-26 RX ORDER — NEOSTIGMINE METHYLSULFATE 1 MG/ML
INJECTION INTRAVENOUS AS NEEDED
Status: DISCONTINUED | OUTPATIENT
Start: 2019-12-26 | End: 2019-12-26 | Stop reason: HOSPADM

## 2019-12-26 RX ORDER — SODIUM CHLORIDE, SODIUM LACTATE, POTASSIUM CHLORIDE, CALCIUM CHLORIDE 600; 310; 30; 20 MG/100ML; MG/100ML; MG/100ML; MG/100ML
75 INJECTION, SOLUTION INTRAVENOUS CONTINUOUS
Status: DISPENSED | OUTPATIENT
Start: 2019-12-26 | End: 2019-12-26

## 2019-12-26 RX ORDER — LORAZEPAM 0.5 MG/1
0.5 TABLET ORAL
Status: DISCONTINUED | OUTPATIENT
Start: 2019-12-26 | End: 2019-12-27 | Stop reason: HOSPADM

## 2019-12-26 RX ORDER — HYDROMORPHONE HYDROCHLORIDE 1 MG/ML
0.2 INJECTION, SOLUTION INTRAMUSCULAR; INTRAVENOUS; SUBCUTANEOUS
Status: DISCONTINUED | OUTPATIENT
Start: 2019-12-26 | End: 2019-12-26 | Stop reason: HOSPADM

## 2019-12-26 RX ORDER — SODIUM CHLORIDE 0.9 % (FLUSH) 0.9 %
5-40 SYRINGE (ML) INJECTION AS NEEDED
Status: DISCONTINUED | OUTPATIENT
Start: 2019-12-26 | End: 2019-12-26 | Stop reason: HOSPADM

## 2019-12-26 RX ORDER — DOCUSATE SODIUM 100 MG/1
100 CAPSULE, LIQUID FILLED ORAL 2 TIMES DAILY
Status: DISCONTINUED | OUTPATIENT
Start: 2019-12-26 | End: 2019-12-27 | Stop reason: HOSPADM

## 2019-12-26 RX ORDER — NALOXONE HYDROCHLORIDE 0.4 MG/ML
0.4 INJECTION, SOLUTION INTRAMUSCULAR; INTRAVENOUS; SUBCUTANEOUS
Status: DISCONTINUED | OUTPATIENT
Start: 2019-12-26 | End: 2019-12-27 | Stop reason: HOSPADM

## 2019-12-26 RX ORDER — MIDAZOLAM HYDROCHLORIDE 1 MG/ML
1 INJECTION, SOLUTION INTRAMUSCULAR; INTRAVENOUS AS NEEDED
Status: DISCONTINUED | OUTPATIENT
Start: 2019-12-26 | End: 2019-12-26 | Stop reason: HOSPADM

## 2019-12-26 RX ORDER — OXYCODONE HYDROCHLORIDE 5 MG/1
5 TABLET ORAL
Status: DISCONTINUED | OUTPATIENT
Start: 2019-12-26 | End: 2019-12-27 | Stop reason: HOSPADM

## 2019-12-26 RX ORDER — FENTANYL CITRATE 50 UG/ML
25 INJECTION, SOLUTION INTRAMUSCULAR; INTRAVENOUS
Status: DISCONTINUED | OUTPATIENT
Start: 2019-12-26 | End: 2019-12-26 | Stop reason: HOSPADM

## 2019-12-26 RX ORDER — PROPOFOL 10 MG/ML
INJECTION, EMULSION INTRAVENOUS AS NEEDED
Status: DISCONTINUED | OUTPATIENT
Start: 2019-12-26 | End: 2019-12-26 | Stop reason: HOSPADM

## 2019-12-26 RX ORDER — FENTANYL CITRATE 50 UG/ML
50 INJECTION, SOLUTION INTRAMUSCULAR; INTRAVENOUS AS NEEDED
Status: DISCONTINUED | OUTPATIENT
Start: 2019-12-26 | End: 2019-12-26 | Stop reason: HOSPADM

## 2019-12-26 RX ORDER — PHENYLEPHRINE HCL IN 0.9% NACL 0.4MG/10ML
SYRINGE (ML) INTRAVENOUS AS NEEDED
Status: DISCONTINUED | OUTPATIENT
Start: 2019-12-26 | End: 2019-12-26 | Stop reason: HOSPADM

## 2019-12-26 RX ORDER — ESCITALOPRAM OXALATE 10 MG/1
10 TABLET ORAL
Status: DISCONTINUED | OUTPATIENT
Start: 2019-12-26 | End: 2019-12-27 | Stop reason: HOSPADM

## 2019-12-26 RX ORDER — FACIAL-BODY WIPES
10 EACH TOPICAL DAILY PRN
Status: DISCONTINUED | OUTPATIENT
Start: 2019-12-26 | End: 2019-12-27 | Stop reason: HOSPADM

## 2019-12-26 RX ORDER — ONDANSETRON 2 MG/ML
INJECTION INTRAMUSCULAR; INTRAVENOUS AS NEEDED
Status: DISCONTINUED | OUTPATIENT
Start: 2019-12-26 | End: 2019-12-26 | Stop reason: HOSPADM

## 2019-12-26 RX ORDER — DEXAMETHASONE SODIUM PHOSPHATE 4 MG/ML
INJECTION, SOLUTION INTRA-ARTICULAR; INTRALESIONAL; INTRAMUSCULAR; INTRAVENOUS; SOFT TISSUE AS NEEDED
Status: DISCONTINUED | OUTPATIENT
Start: 2019-12-26 | End: 2019-12-26 | Stop reason: HOSPADM

## 2019-12-26 RX ORDER — MIDAZOLAM HYDROCHLORIDE 1 MG/ML
0.5 INJECTION, SOLUTION INTRAMUSCULAR; INTRAVENOUS
Status: DISCONTINUED | OUTPATIENT
Start: 2019-12-26 | End: 2019-12-26 | Stop reason: HOSPADM

## 2019-12-26 RX ORDER — ACETAMINOPHEN 325 MG/1
650 TABLET ORAL ONCE
Status: DISCONTINUED | OUTPATIENT
Start: 2019-12-26 | End: 2019-12-26 | Stop reason: HOSPADM

## 2019-12-26 RX ORDER — CEFUROXIME AXETIL 500 MG/1
250 TABLET ORAL 2 TIMES DAILY
Qty: 6 TAB | Refills: 0 | Status: SHIPPED | OUTPATIENT
Start: 2019-12-26 | End: 2019-12-29

## 2019-12-26 RX ORDER — LIDOCAINE HYDROCHLORIDE 20 MG/ML
INJECTION, SOLUTION EPIDURAL; INFILTRATION; INTRACAUDAL; PERINEURAL AS NEEDED
Status: DISCONTINUED | OUTPATIENT
Start: 2019-12-26 | End: 2019-12-26 | Stop reason: HOSPADM

## 2019-12-26 RX ORDER — DIPHENHYDRAMINE HYDROCHLORIDE 50 MG/ML
12.5 INJECTION, SOLUTION INTRAMUSCULAR; INTRAVENOUS AS NEEDED
Status: DISCONTINUED | OUTPATIENT
Start: 2019-12-26 | End: 2019-12-26 | Stop reason: HOSPADM

## 2019-12-26 RX ORDER — ROCURONIUM BROMIDE 10 MG/ML
INJECTION, SOLUTION INTRAVENOUS AS NEEDED
Status: DISCONTINUED | OUTPATIENT
Start: 2019-12-26 | End: 2019-12-26 | Stop reason: HOSPADM

## 2019-12-26 RX ORDER — PANTOPRAZOLE SODIUM 40 MG/1
40 TABLET, DELAYED RELEASE ORAL
Status: DISCONTINUED | OUTPATIENT
Start: 2019-12-27 | End: 2019-12-27 | Stop reason: HOSPADM

## 2019-12-26 RX ORDER — ZOLPIDEM TARTRATE 5 MG/1
5 TABLET ORAL
Status: DISCONTINUED | OUTPATIENT
Start: 2019-12-26 | End: 2019-12-27 | Stop reason: HOSPADM

## 2019-12-26 RX ORDER — EPHEDRINE SULFATE/0.9% NACL/PF 50 MG/5 ML
SYRINGE (ML) INTRAVENOUS AS NEEDED
Status: DISCONTINUED | OUTPATIENT
Start: 2019-12-26 | End: 2019-12-26 | Stop reason: HOSPADM

## 2019-12-26 RX ORDER — HYDROCODONE BITARTRATE AND ACETAMINOPHEN 5; 325 MG/1; MG/1
1 TABLET ORAL
Qty: 12 TAB | Refills: 0 | Status: SHIPPED | OUTPATIENT
Start: 2019-12-26 | End: 2019-12-29

## 2019-12-26 RX ORDER — SUCCINYLCHOLINE CHLORIDE 20 MG/ML
INJECTION INTRAMUSCULAR; INTRAVENOUS AS NEEDED
Status: DISCONTINUED | OUTPATIENT
Start: 2019-12-26 | End: 2019-12-26 | Stop reason: HOSPADM

## 2019-12-26 RX ORDER — SODIUM CHLORIDE 9 MG/ML
25 INJECTION, SOLUTION INTRAVENOUS CONTINUOUS
Status: DISCONTINUED | OUTPATIENT
Start: 2019-12-26 | End: 2019-12-26 | Stop reason: HOSPADM

## 2019-12-26 RX ORDER — OXYCODONE HYDROCHLORIDE 5 MG/1
5 TABLET ORAL AS NEEDED
Status: DISCONTINUED | OUTPATIENT
Start: 2019-12-26 | End: 2019-12-26 | Stop reason: HOSPADM

## 2019-12-26 RX ORDER — NALOXONE HYDROCHLORIDE 0.4 MG/ML
INJECTION, SOLUTION INTRAMUSCULAR; INTRAVENOUS; SUBCUTANEOUS AS NEEDED
Status: DISCONTINUED | OUTPATIENT
Start: 2019-12-26 | End: 2019-12-26 | Stop reason: HOSPADM

## 2019-12-26 RX ORDER — DOFETILIDE 0.25 MG/1
250 CAPSULE ORAL 2 TIMES DAILY
Status: DISCONTINUED | OUTPATIENT
Start: 2019-12-26 | End: 2019-12-27 | Stop reason: HOSPADM

## 2019-12-26 RX ORDER — GLYCOPYRROLATE 0.2 MG/ML
INJECTION INTRAMUSCULAR; INTRAVENOUS AS NEEDED
Status: DISCONTINUED | OUTPATIENT
Start: 2019-12-26 | End: 2019-12-26 | Stop reason: HOSPADM

## 2019-12-26 RX ORDER — FUROSEMIDE 40 MG/1
40 TABLET ORAL DAILY
Status: DISCONTINUED | OUTPATIENT
Start: 2019-12-27 | End: 2019-12-27 | Stop reason: HOSPADM

## 2019-12-26 RX ORDER — ACETAMINOPHEN 325 MG/1
650 TABLET ORAL
Status: DISCONTINUED | OUTPATIENT
Start: 2019-12-26 | End: 2019-12-27 | Stop reason: HOSPADM

## 2019-12-26 RX ORDER — PHENAZOPYRIDINE HYDROCHLORIDE 200 MG/1
200 TABLET, FILM COATED ORAL
Qty: 20 TAB | Refills: 0 | Status: SHIPPED | OUTPATIENT
Start: 2019-12-26 | End: 2019-12-31

## 2019-12-26 RX ORDER — SODIUM CHLORIDE, SODIUM LACTATE, POTASSIUM CHLORIDE, CALCIUM CHLORIDE 600; 310; 30; 20 MG/100ML; MG/100ML; MG/100ML; MG/100ML
25 INJECTION, SOLUTION INTRAVENOUS CONTINUOUS
Status: DISCONTINUED | OUTPATIENT
Start: 2019-12-26 | End: 2019-12-26 | Stop reason: HOSPADM

## 2019-12-26 RX ORDER — SODIUM CHLORIDE 0.9 % (FLUSH) 0.9 %
5-40 SYRINGE (ML) INJECTION AS NEEDED
Status: DISCONTINUED | OUTPATIENT
Start: 2019-12-26 | End: 2019-12-27 | Stop reason: HOSPADM

## 2019-12-26 RX ORDER — ONDANSETRON 2 MG/ML
4 INJECTION INTRAMUSCULAR; INTRAVENOUS
Status: DISCONTINUED | OUTPATIENT
Start: 2019-12-26 | End: 2019-12-27 | Stop reason: HOSPADM

## 2019-12-26 RX ORDER — SODIUM CHLORIDE, SODIUM LACTATE, POTASSIUM CHLORIDE, CALCIUM CHLORIDE 600; 310; 30; 20 MG/100ML; MG/100ML; MG/100ML; MG/100ML
25 INJECTION, SOLUTION INTRAVENOUS ONCE
Status: COMPLETED | OUTPATIENT
Start: 2019-12-26 | End: 2019-12-26

## 2019-12-26 RX ORDER — ONDANSETRON 2 MG/ML
4 INJECTION INTRAMUSCULAR; INTRAVENOUS AS NEEDED
Status: DISCONTINUED | OUTPATIENT
Start: 2019-12-26 | End: 2019-12-26 | Stop reason: HOSPADM

## 2019-12-26 RX ORDER — FENTANYL CITRATE 50 UG/ML
INJECTION, SOLUTION INTRAMUSCULAR; INTRAVENOUS AS NEEDED
Status: DISCONTINUED | OUTPATIENT
Start: 2019-12-26 | End: 2019-12-26 | Stop reason: HOSPADM

## 2019-12-26 RX ADMIN — ZOLPIDEM TARTRATE 5 MG: 5 TABLET ORAL at 21:46

## 2019-12-26 RX ADMIN — SODIUM CHLORIDE, SODIUM LACTATE, POTASSIUM CHLORIDE, AND CALCIUM CHLORIDE 25 ML/HR: 600; 310; 30; 20 INJECTION, SOLUTION INTRAVENOUS at 09:24

## 2019-12-26 RX ADMIN — ROCURONIUM BROMIDE 10 MG: 10 INJECTION INTRAVENOUS at 11:39

## 2019-12-26 RX ADMIN — DOCUSATE SODIUM 100 MG: 100 CAPSULE, LIQUID FILLED ORAL at 20:51

## 2019-12-26 RX ADMIN — LIDOCAINE HYDROCHLORIDE 100 MG: 20 INJECTION, SOLUTION INTRAVENOUS at 11:19

## 2019-12-26 RX ADMIN — NEOSTIGMINE METHYLSULFATE 3 MG: 1 INJECTION INTRAVENOUS at 12:40

## 2019-12-26 RX ADMIN — SODIUM CHLORIDE, POTASSIUM CHLORIDE, SODIUM LACTATE AND CALCIUM CHLORIDE: 600; 310; 30; 20 INJECTION, SOLUTION INTRAVENOUS at 11:03

## 2019-12-26 RX ADMIN — ROCURONIUM BROMIDE 10 MG: 10 INJECTION INTRAVENOUS at 11:24

## 2019-12-26 RX ADMIN — PROPOFOL 160 MG: 10 INJECTION, EMULSION INTRAVENOUS at 11:19

## 2019-12-26 RX ADMIN — Medication 10 ML: at 18:26

## 2019-12-26 RX ADMIN — NALXONE HYDROCHLORIDE 0.2 MG: 0.4 INJECTION INTRAMUSCULAR; INTRAVENOUS; SUBCUTANEOUS at 12:45

## 2019-12-26 RX ADMIN — DEXAMETHASONE SODIUM PHOSPHATE 8 MG: 4 INJECTION, SOLUTION INTRAMUSCULAR; INTRAVENOUS at 11:32

## 2019-12-26 RX ADMIN — OXYCODONE HYDROCHLORIDE 5 MG: 5 TABLET ORAL at 18:37

## 2019-12-26 RX ADMIN — Medication 80 MCG: at 11:23

## 2019-12-26 RX ADMIN — CARVEDILOL 3.12 MG: 6.25 TABLET, FILM COATED ORAL at 18:26

## 2019-12-26 RX ADMIN — ROCURONIUM BROMIDE 10 MG: 10 INJECTION INTRAVENOUS at 11:19

## 2019-12-26 RX ADMIN — DOFETILIDE 250 MCG: 0.25 CAPSULE ORAL at 21:48

## 2019-12-26 RX ADMIN — ESCITALOPRAM OXALATE 10 MG: 10 TABLET ORAL at 21:47

## 2019-12-26 RX ADMIN — OXYCODONE HYDROCHLORIDE 5 MG: 5 TABLET ORAL at 23:43

## 2019-12-26 RX ADMIN — Medication 10 MG: at 11:29

## 2019-12-26 RX ADMIN — PRAVASTATIN SODIUM 40 MG: 40 TABLET ORAL at 21:46

## 2019-12-26 RX ADMIN — SUCCINYLCHOLINE CHLORIDE 140 MG: 20 INJECTION, SOLUTION INTRAMUSCULAR; INTRAVENOUS at 11:19

## 2019-12-26 RX ADMIN — PHENYLEPHRINE HYDROCHLORIDE 40 MCG: 10 INJECTION INTRAVENOUS at 11:28

## 2019-12-26 RX ADMIN — SODIUM CHLORIDE, SODIUM LACTATE, POTASSIUM CHLORIDE, AND CALCIUM CHLORIDE 75 ML/HR: 600; 310; 30; 20 INJECTION, SOLUTION INTRAVENOUS at 13:55

## 2019-12-26 RX ADMIN — WATER 2 G: 1 INJECTION INTRAMUSCULAR; INTRAVENOUS; SUBCUTANEOUS at 11:25

## 2019-12-26 RX ADMIN — ONDANSETRON HYDROCHLORIDE 4 MG: 2 INJECTION, SOLUTION INTRAMUSCULAR; INTRAVENOUS at 12:29

## 2019-12-26 RX ADMIN — WATER 2 G: 1 INJECTION INTRAMUSCULAR; INTRAVENOUS; SUBCUTANEOUS at 18:34

## 2019-12-26 RX ADMIN — FENTANYL CITRATE 50 MCG: 50 INJECTION, SOLUTION INTRAMUSCULAR; INTRAVENOUS at 11:19

## 2019-12-26 RX ADMIN — FENTANYL CITRATE 50 MCG: 50 INJECTION, SOLUTION INTRAMUSCULAR; INTRAVENOUS at 11:13

## 2019-12-26 RX ADMIN — GLYCOPYRROLATE 0.4 MG: 0.2 INJECTION, SOLUTION INTRAMUSCULAR; INTRAVENOUS at 12:40

## 2019-12-26 RX ADMIN — Medication 3 AMPULE: at 09:34

## 2019-12-26 NOTE — PERIOP NOTES
TRANSFER - OUT REPORT:    Verbal report given to [address] RN(name) on Krupa Rocha  being transferred to [facility]/2136(unit) for routine post - op       Report consisted of patients Situation, Background, Assessment and   Recommendations(SBAR). Information from the following report(s) SBAR, OR Summary, Intake/Output, MAR and Cardiac Rhythm Paced 100% was reviewed with the receiving nurse. Opportunity for questions and clarification was provided. Patient transported with:   Tech     Prescriptions on chart x3: Norco, Pyridium & Ceftin - unsigned. Dr. Haily Gonzalez aware. Family notified of transfer by volunteer.

## 2019-12-26 NOTE — ROUTINE PROCESS
Patient: Shay Ragland MRN: [id number]  SSN: [id number] YOB: 1940  Age: 70 y.o. Sex: male Patient is status post Procedure(s): 
CYSTOSCOPY WITH RETROGRADES, TRANSURETHRAL RESECTION OF BLADDER TUMOR 
TRANSURETHRAL RESECTION OF BLADDER TUMOR. Surgeon(s) and Role: Inderjit Edge MD - Primary Local/Dose/Irrigation:  NA 
 
             
Peripheral IV 12/26/19 Left Arm (Active) Site Assessment Clean, dry, & intact 12/26/2019  9:23 AM  
Phlebitis Assessment 0 12/26/2019  9:23 AM  
Infiltration Assessment 0 12/26/2019  9:23 AM  
Dressing Status Clean, dry, & intact 12/26/2019  9:23 AM  
Dressing Type Tape;Transparent 12/26/2019  9:23 AM  
Hub Color/Line Status Pink; Infusing 12/26/2019  9:23 AM  
                 
 
 
 
Dressing/Packing:      
Splint/Cast:  ] Other:  na

## 2019-12-26 NOTE — ANESTHESIA POSTPROCEDURE EVALUATION
Procedure(s):  CYSTOSCOPY WITH RETROGRADES, TRANSURETHRAL RESECTION OF BLADDER TUMOR  TRANSURETHRAL RESECTION OF BLADDER TUMOR.    general    Anesthesia Post Evaluation        Patient location during evaluation: PACU  Note status: Adequate. Level of consciousness: responsive to verbal stimuli and sleepy but conscious  Pain management: satisfactory to patient  Airway patency: patent  Anesthetic complications: no  Cardiovascular status: acceptable  Respiratory status: acceptable  Hydration status: acceptable  Comments: +Post-Anesthesia Evaluation and Assessment    Patient: Asad Brewster MRN: [id number]  SSN: [id number]   YOB: 1940  Age: 70 y.o. Sex: male      Cardiovascular Function/Vital Signs    /78   Pulse 65   Temp 36.4 °C (97.6 °F)   Resp 16   Ht 5' 10\" (1.778 m)   Wt 79.7 kg (175 lb 11.3 oz)   SpO2 96%   BMI 25.21 kg/m²     Patient is status post Procedure(s):  CYSTOSCOPY WITH RETROGRADES, TRANSURETHRAL RESECTION OF BLADDER TUMOR  TRANSURETHRAL RESECTION OF BLADDER TUMOR. Nausea/Vomiting: Controlled. Postoperative hydration reviewed and adequate. Pain:  Pain Scale 1: Numeric (0 - 10) (12/26/19 1310)  Pain Intensity 1: 2 (12/26/19 1310)   Managed. Neurological Status:   Neuro (WDL): Exceptions to WDL (12/26/19 1305)   At baseline. Mental Status and Level of Consciousness: Arousable. Pulmonary Status:   O2 Device: Nasal cannula (12/26/19 1315)   Adequate oxygenation and airway patent. Complications related to anesthesia: None    Post-anesthesia assessment completed. No concerns. Signed By: Harsha Moy MD    12/26/2019  Post anesthesia nausea and vomiting:  controlled      Vitals Value Taken Time   /78 12/26/2019  2:00 PM   Temp 36.4 °C (97.6 °F) 12/26/2019  1:01 PM   Pulse 70 12/26/2019  2:09 PM   Resp 11 12/26/2019  2:09 PM   SpO2 94 % 12/26/2019  2:09 PM   Vitals shown include unvalidated device data.

## 2019-12-26 NOTE — PROGRESS NOTES
Paged urology on-call doctor  regarding patient request for medication for constipation and something to help him sleep.

## 2019-12-26 NOTE — PERIOP NOTES
Handoff Report from Operating Room to PACU    Report received from FIONA Vincent RN and FLYNN James CRNA regarding Asad Brewster. Surgeon(s):  Abram Gonzalez MD  And Procedure(s) (LRB):  CYSTOSCOPY WITH RETROGRADES, TRANSURETHRAL RESECTION OF BLADDER TUMOR (N/A)  TRANSURETHRAL RESECTION OF BLADDER TUMOR (N/A)  confirmed   with allergies and continuous bladder irrigation discussed. Anesthesia type, drugs, patient history, complications, estimated blood loss, vital signs, intake and output, and last pain medication, lines, reversal medications and temperature were reviewed.

## 2019-12-26 NOTE — H&P
He returns today for follow-up. Referred for instantly found bladder mass. Patient underwent back surgery that was quite successful since his last visit with me. He has renal insufficiency as well as was discovered to have anemia. He underwent an abdominal ultrasound that revealed a questionable mass within the bladder. 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 Also a masslike focus in the bladder that measures 3.6 cm left posterior lateral aspect. He has a known history of BPH with 100 g gland. History of elevated PSA with a negative prostate biopsy in July 2017. PSA and exam 1 year ago were normal with a PSA of 2.47. Patient smoked in college but has not since. Denies any hematuria or dysuria. Reports no unexpected bone pain or weight loss. PAST MEDICAL HISTORY:    Allergies: No known allergies. DENIES: Latex, Shellfish, X-Ray Dye, Iodine. Medications: LASIX 20 MG ORAL TABLET (FUROSEMIDE) twic daily; Route: ORAL  ASPIRIN 325 MG ORAL TABLET (ASPIRIN) once daily; Route: ORAL  CARVEDILOL 6.25 MG ORAL TABLET (CARVEDILOL) 2 x nightly; Route: ORAL  LEXAPRO 10 MG ORAL TABLET (ESCITALOPRAM OXALATE) 1 daily; Route: ORAL  SIMVASTATIN 20 MG ORAL TABLET (SIMVASTATIN) daily; Route: ORAL  LORAZEPAM 0.5 MG ORAL TABLET (LORAZEPAM) as needed; Route: ORAL  ELIQUIS 5 MG ORAL TABLET (APIXABAN) twice daily; Route: ORAL    Problems: Bladder Lesion (ICD-239.4) (ECK22-B10.2)  Erectile dysfunction (RHV-770.29) (DNU02-J58.9)  Nocturia (ICD-788.43) (YCE81-A21.1)  BPH w/o urinary obstruction (ICD-600.00) (LIU38-O03.0)  Elevated prostate specific antigen [PSA] (BAZ14-A10.20)    Illnesses: Pacemaker/Defibrillator and High Blood Pressure. DENIES: Heart Disease, Lung Disease, Diabetes, Bowel Problems, Stroke/Seizure, Kidney Problems, Bleeding Problems, HIV, Hepatitis, or Cancer. Surgeries: Gallbladder Surgery. Family History: DENIES: Prostate cancer, Kidney cancer, Kidney disease, Kidney stones.      Social History: Full Time. . Smoking status: former smoker. Does not drink alcohol. System Review: Admits to: Shortness of Breath, Constipation, Impaired Sex Drive, and Easy Bleeding. DENIES: Unexplained Weight Loss, Dry Eyes, Dry Mouth, Leg Swelling, Involuntary Urine Loss, Lower Extremity Weakness, Dry Skin, Difficulty Walking, Psychiatric Problems, Rash. EXAMINATION: Appearance: well-developed NAD Respiratory Effort: breathing easily Orientation: oriented to person; time and place Mood/Affect: normal       URINALYSIS    PSA HISTORY  2.47 ng/ml on 10/02/2018  6.03 ng/ml on 06/26/2017  4.06 ng/ml on 06/23/2016    IMPRESSION:    1. BPH W/O URINARY OBSTRUCTION (QBW11-Y06.0) - Unchanged    2. ELEVATED PROSTATE SPECIFIC ANTIGEN [PSA] - Improved    3. BLADDER LESION (YJF18-Q14.4) - New    PLAN: PSA. We discussed a flexible cystoscopic examination in the office but the patient was reticent to do this for fear of discomfort. Therefore we will get clearance from his cardiologist to come off Eliquis and plan anesthetic cystoscopic examination with bilateral retrograde pyelograms possible TURBT. Also exam under anesthesia. The risks, alternatives as well as benefits of the procedure including but limited to bleeding, infection as well as injury contiguous structures discussed. He also understands he may have to have a catheter for 1 week. cc: Natasha Falcon MD  Transcribed by Speech to Text Technology  [employer]  E&M Service    Upcoming Orders  Schedule Surgery - Schedule for CYSTOSCOPIC EXAMINATION WITH BILATERAL RETROGRADE PYELOGRAMS POSSIBLE TURBT. ALSO EXAM UNDER ANESTHESIA - 1 hr at [facility] under General anesthesia. Requesting Next Available (Me). Pre-op Clearance Cardiology. Pre-Op Abx: Ancef 1gm IV OCTOR. Office followup 1 week. REQUEST RESECTOSCOPE. NO MITOMYCIN PLANNED.    PSA

## 2019-12-26 NOTE — BRIEF OP NOTE
BRIEF OPERATIVE NOTE    Date of Procedure: 12/26/2019   Preoperative Diagnosis: BLADDER LEISON  Postoperative Diagnosis: * No post-op diagnosis entered *    Procedure(s):  CYSTOSCOPY WITH RETROGRADES, TRANSURETHRAL RESECTION OF Large  BLADDER TUMOR  , EUA  Surgeon(s) and Role:     Sharda Oneil MD - Primary         Surgical Assistant: none    Surgical Staff:  Circ-1: Shae London RN  Scrub Tech-1: Vida Foster  Scrub RN-Relief: Donita Romero  Event Time In Time Out   Incision Start 1134    Incision Close 1234      Anesthesia: General   Estimated Blood Loss: less than 100cc  Specimens:   ID Type Source Tests Collected by Time Destination   1 : Bladder Tumor Preservative Bladder  Lea Whitfield MD 12/26/2019 1215 Pathology      Findings: BPH, Large bladder tumor   Complications: none  Implants: * No implants in log *

## 2019-12-26 NOTE — PROGRESS NOTES
Patient seen in room. Urine light pink on moderate CBI. He feels and looks well. Abd soft. Updated Dr. Karishma Forman.

## 2019-12-26 NOTE — DISCHARGE INSTRUCTIONS
Patient Education     Mann Saucer blood thinner in 3 days if urine is clear     Learning About Transurethral Resection for Bladder Cancer  What is transurethral resection (TUR) for bladder cancer? Transurethral resection (TUR) of the bladder is a surgery that removes cancerous tissue. It does not remove the bladder. TUR is the most common and effective treatment for early-stage bladder cancer. It may also work well for more advanced cancer if all the cancer can be removed and biopsies show that no cancer cells remain. How is TUR done? Your doctor will give you medicine to make you sleep or feel relaxed. You will not feel pain. The doctor will put a thin, lighted tool called a cystoscope, or scope, into your urethra. The urethra is the tube that carries urine from the bladder to the outside of the body. Then the doctor will gently thread the scope into your bladder. Your bladder will then be filled with fluid. This stretches the bladder so that your doctor can clearly see the inside of your bladder. Your doctor will use small surgical tools through the scope to remove and/or burn away any cancer cells. What can you expect after TUR? Your doctor may leave a small tube called a catheter in the urethra to help prevent blockage of the urethra. When the bleeding from surgery has stopped, the tube is removed. You may need to stay in the hospital for 1 to 4 days. You may still have the catheter when you go home. You may feel the need to urinate often for a while after the surgery. But this should improve with time. It may burn when you urinate. Drink lots of fluids to help with the burning. Your urine also may look pink for up to 2 to 3 weeks after surgery. This is because there may be blood in it. You may have to avoid strenuous activity and heavy lifting for about 3 weeks after TUR. Your doctor may suggest that you have chemotherapy or biological therapy after the procedure. Bladder cancer can come back.  You will need regular exams for the rest of your life to check for the cancer. You may need the surgery again. Follow-up care is a key part of your treatment and safety. Be sure to make and go to all appointments, and call your doctor if you are having problems. It's also a good idea to know your test results and keep a list of the medicines you take. Where can you learn more? Go to http://milton-nadege.info/. Enter T508 in the search box to learn more about \"Learning About Transurethral Resection for Bladder Cancer. \"  Current as of: December 19, 2018  Content Version: 12.2  © 0741-8423 [employer]. Care instructions adapted under license by [employer] (which disclaims liability or warranty for this information). If you have questions about a medical condition or this instruction, always ask your healthcare professional. [address] any warranty or liability for your use of this information.

## 2019-12-27 VITALS
HEIGHT: 70 IN | TEMPERATURE: 97.9 F | WEIGHT: 175.71 LBS | OXYGEN SATURATION: 69 % | HEART RATE: 69 BPM | SYSTOLIC BLOOD PRESSURE: 99 MMHG | DIASTOLIC BLOOD PRESSURE: 59 MMHG | RESPIRATION RATE: 16 BRPM | BODY MASS INDEX: 25.15 KG/M2

## 2019-12-27 LAB
ANION GAP SERPL CALC-SCNC: 6 MMOL/L (ref 5–15)
BUN SERPL-MCNC: 27 MG/DL (ref 6–20)
BUN/CREAT SERPL: 18 (ref 12–20)
CALCIUM SERPL-MCNC: 8.6 MG/DL (ref 8.5–10.1)
CHLORIDE SERPL-SCNC: 108 MMOL/L (ref 97–108)
CO2 SERPL-SCNC: 27 MMOL/L (ref 21–32)
CREAT SERPL-MCNC: 1.52 MG/DL (ref 0.7–1.3)
GLUCOSE SERPL-MCNC: 98 MG/DL (ref 65–100)
HGB BLD-MCNC: 11.2 G/DL (ref 12.1–17)
MAGNESIUM SERPL-MCNC: 2.2 MG/DL (ref 1.6–2.4)
PHOSPHATE SERPL-MCNC: 3.6 MG/DL (ref 2.6–4.7)
POTASSIUM SERPL-SCNC: 3.9 MMOL/L (ref 3.5–5.1)
SODIUM SERPL-SCNC: 141 MMOL/L (ref 136–145)

## 2019-12-27 PROCEDURE — 74011250636 HC RX REV CODE- 250/636: Performed by: UROLOGY

## 2019-12-27 PROCEDURE — 99218 HC RM OBSERVATION: CPT

## 2019-12-27 PROCEDURE — 74011250637 HC RX REV CODE- 250/637: Performed by: UROLOGY

## 2019-12-27 PROCEDURE — 80048 BASIC METABOLIC PNL TOTAL CA: CPT

## 2019-12-27 PROCEDURE — 74011000250 HC RX REV CODE- 250: Performed by: UROLOGY

## 2019-12-27 PROCEDURE — 84100 ASSAY OF PHOSPHORUS: CPT

## 2019-12-27 PROCEDURE — 36415 COLL VENOUS BLD VENIPUNCTURE: CPT

## 2019-12-27 PROCEDURE — 85018 HEMOGLOBIN: CPT

## 2019-12-27 PROCEDURE — 83735 ASSAY OF MAGNESIUM: CPT

## 2019-12-27 RX ADMIN — POTASSIUM BICARBONATE 40 MEQ: 782 TABLET, EFFERVESCENT ORAL at 11:01

## 2019-12-27 RX ADMIN — OXYCODONE HYDROCHLORIDE 5 MG: 5 TABLET ORAL at 04:04

## 2019-12-27 RX ADMIN — DOCUSATE SODIUM 200 MG: 100 CAPSULE, LIQUID FILLED ORAL at 10:19

## 2019-12-27 RX ADMIN — MAGNESIUM HYDROXIDE 30 ML: 400 SUSPENSION ORAL at 06:54

## 2019-12-27 RX ADMIN — DOFETILIDE 250 MCG: 0.25 CAPSULE ORAL at 10:20

## 2019-12-27 RX ADMIN — Medication 10 ML: at 06:54

## 2019-12-27 RX ADMIN — WATER 2 G: 1 INJECTION INTRAMUSCULAR; INTRAVENOUS; SUBCUTANEOUS at 03:23

## 2019-12-27 RX ADMIN — CARVEDILOL 3.12 MG: 6.25 TABLET, FILM COATED ORAL at 10:20

## 2019-12-27 RX ADMIN — DOCUSATE SODIUM 100 MG: 100 CAPSULE, LIQUID FILLED ORAL at 10:19

## 2019-12-27 RX ADMIN — PANTOPRAZOLE SODIUM 40 MG: 40 TABLET, DELAYED RELEASE ORAL at 06:54

## 2019-12-27 RX ADMIN — WATER 2 G: 1 INJECTION INTRAMUSCULAR; INTRAVENOUS; SUBCUTANEOUS at 10:21

## 2019-12-27 NOTE — DISCHARGE SUMMARY
Urology Discharge Summary    Patient: Debbie Rayo MRN: [id number]  SSN: [id number]    YOB: 1940  Age: 70 y.o. Sex: male             ADMISSION:  to Shreya Andre MD on 12/26/2019  DATE OF DISCHARGE:  12/27/2019    ADMISSION DIAGNOSIS: Bladder tumor [D49.4]  DISCHARGE DIAGNOSIS: Same    PROCEDURES: Procedure(s):  CYSTOSCOPY WITH RETROGRADES, TRANSURETHRAL RESECTION OF BLADDER TUMOR  TRANSURETHRAL RESECTION OF BLADDER TUMOR    COMPLICATIONS: None identified. Current Discharge Medication List      START taking these medications    Details   cefUROXime (CEFTIN) 500 mg tablet Take 0.5 Tabs by mouth two (2) times a day for 3 days. Qty: 6 Tab, Refills: 0      HYDROcodone-acetaminophen (NORCO) 5-325 mg per tablet Take 1 Tab by mouth every six (6) hours as needed for Pain for up to 3 days. Max Daily Amount: 4 Tabs. Qty: 12 Tab, Refills: 0    Associated Diagnoses: Bladder tumor      phenazopyridine (PYRIDIUM) 200 mg tablet Take 1 Tab by mouth three (3) times daily as needed for Pain for up to 5 days. Qty: 20 Tab, Refills: 0         CONTINUE these medications which have NOT CHANGED    Details   docusate sodium (DOK) 250 mg capsule Take 250 mg by mouth daily. Lactobacillus acidophilus (PROBIOTIC PO) Take  by mouth daily. 90 billion      furosemide (LASIX) 20 mg tablet Take 40 mg by mouth daily. Two 20mg tablets daily - takes both pills at same time      docosahexanoic acid/epa (FISH OIL PO) Take  by mouth daily. escitalopram oxalate (LEXAPRO) 10 mg tablet Take 10 mg by mouth nightly. dofetilide (TIKOSYN) 250 mcg capsule Take 250 mcg by mouth two (2) times a day. carvedilol (COREG) 3.125 mg tablet Take 3.125 mg by mouth two (2) times daily (with meals). omeprazole (PRILOSEC) 40 mg capsule Take 40 mg by mouth nightly. LORazepam (ATIVAN) 0.5 mg tablet Take 0.5 mg by mouth as needed for Anxiety.       simvastatin (ZOCOR) 20 mg tablet Take 20 mg by mouth nightly. STOP taking these medications       apixaban (ELIQUIS) 5 mg tablet Comments:   Reason for Stopping:               RECENT LABS:   Lab Results   Component Value Date/Time    WBC 8.9 12/17/2019 02:16 PM    HCT 41.2 12/17/2019 02:16 PM    PLATELET 424 86/96/8570 02:16 PM    Sodium 141 12/27/2019 01:56 AM    Potassium 3.9 12/27/2019 01:56 AM    Chloride 108 12/27/2019 01:56 AM    CO2 27 12/27/2019 01:56 AM    BUN 27 (H) 12/27/2019 01:56 AM    Creatinine 1.52 (H) 12/27/2019 01:56 AM    Glucose 98 12/27/2019 01:56 AM    Calcium 8.6 12/27/2019 01:56 AM    Magnesium 2.2 12/27/2019 01:56 AM    Phosphorus 3.6 12/27/2019 01:56 AM    INR 1.1 01/18/2015 09:20 AM    INR (POC) 1.2 (H) 05/03/2016 08:16 PM    Prostate Specific Ag 7.1 (H) 01/19/2015 03:56 PM            HOSPITAL COURSE: Maxwell Canchola underwent Procedure(s):  CYSTOSCOPY WITH RETROGRADES, TRANSURETHRAL RESECTION OF BLADDER TUMOR  TRANSURETHRAL RESECTION OF BLADDER TUMOR on 09/11/4622 without complications. He had an uneventful recovery. His activity was increased, his diet was advanced and his pain control was transitioned to oral medications. He was discharged to home 1 Day Post-Op. CONDITION AT DISCHARGE:  Good. DISCHARGE TO:     Home.     FOLLOWUP:  1/6/2020 @ 9:30 @ [facility]      Alejandra Saravia NP 12/27/2019 9:37 AM

## 2019-12-27 NOTE — PROGRESS NOTES
PCP RHODA appt scheduled with  on 1/3/2020 at 2:00pm Appt added to AVS. JAIMIE Franklin CM Specialist

## 2019-12-27 NOTE — PROGRESS NOTES
Progress Note    Patient: Grey Ramos MRN: [id number]  SSN: [id number]    YOB: 1940  Age: 70 y.o. Sex: male        ADMITTED:  2019 to Mckenzie Livingston MD  for Bladder tumor [D49.4]         Grey Ramos is 1 Day Post-Op Procedure(s):  CYSTOSCOPY WITH RETROGRADES, TRANSURETHRAL RESECTION OF BLADDER TUMOR  TRANSURETHRAL RESECTION OF BLADDER TUMOR. He is doing well. He has mild pain, controlled well w/ pain meds. He has no nausea. He is tolerating a solid diet. Indwelling, 3 way catheter is draining well, on CBI @ a very slow drip rate. Output is clear. T 97.6. Hgb 11.2. BUN 27 Cr 1.52. Vitals:  Temp (24hrs), Av.9 °F (36.6 °C), Min:97.4 °F (36.3 °C), Max:98.5 °F (36.9 °C)     Blood pressure 99/59, pulse 69, temperature 97.9 °F (36.6 °C), resp. rate 16, height 5' 10\" (1.778 m), weight 79.7 kg (175 lb 11.3 oz), SpO2 (!) 69 %. I&O's:  1901 -  07  In: 05998 [P.O.:500; I.V.:800]  Out: 93412 [Urine:28828]    0701 - 1900  In: 225 [I.V.:225]  Out: -      Exam:   abdomen soft, appropriately TTP at surgical sites. All incisions clean/dry/intact without evidence of infection. BRAXTON with serosanguinous drainage. Steward with clear urine output. Labs:   Recent Labs     19  0156   HGB 11.2*     Recent Labs     19  0156      K 3.9      CO2 27   GLU 98   BUN 27*   CREA 1.52*   CA 8.6          Assessment:     - 1 Day Post-Op Procedure(s):  CYSTOSCOPY WITH RETROGRADES, TRANSURETHRAL RESECTION OF BLADDER TUMOR  TRANSURETHRAL RESECTION OF BLADDER TUMOR    Active Problems:    Bladder tumor (2019)        Plan:     -DC today. Prescriptions in paper chart.  -Stop CBI completely and plug port, as long as urine remains clear.  -Steward education. Please provide leg and bedside bag.  -Follow-up visit w/ Dr. Coral Lim scheduled for 2019 @ 9:30 @ [facility] office.       Signed By: Scarlet Traore NP - December 27, 2019

## 2019-12-27 NOTE — ROUTINE PROCESS
Three Prescriptions given. Discharge medications reviewed with patient and his wife; appropriate educational materials and side effects teaching were provided. IV removed and catheter intact. Pt being discharged with lazaro; switched to leg bag. Teaching with return demonstration completed. Follow up appointments reviewed. Patient escorted to main entrance by volunteer for discharge. All personal belongings with patient.

## 2019-12-27 NOTE — OP NOTES
[address]  OPERATIVE REPORT    Name:  Abe Sandifer  MR#:  [id number]  :  1940  ACCOUNT #:  [de-identified]  DATE OF SERVICE:  2019    PREOPERATIVE DIAGNOSIS:  Bladder tumor. POSTOPERATIVE DIAGNOSES:  1.  Bladder tumor. 2.  Benign prostatic hyperplasia. PROCEDURE PERFORMED:  Transurethral resection of large bladder tumor, exam under anesthesia, and attempted retrograde pyelogram.    SURGEON:  Julio Toledo MD    ASSISTANT:  none    ANESTHESIA:  gen. COMPLICATIONS:  None. SPECIMENS REMOVED:  Bladder tumor. IMPLANTS:  None. ESTIMATED BLOOD LOSS:  Less than 100 mL. PROCEDURE:  After obtaining informed consent, the patient received preoperative antibiotics and SCDs and TEDs were placed in the lower extremities. He was then placed on the operating table in supine position. After adequate induction of general anesthetic, he was placed in the low lithotomy position and all pressure points were carefully padded. The penis was prepped and draped in sterile fashion. A full time-out was accomplished. A 21-Tanzanian cystoscope was advanced under direct visualization into the bladder. The patient had a very large obstructing prostate gland with significant median lobe component. I was able to identify ureteral orifice because of the distortion of the anatomy from the median lobe. There was a large greater than 5 cm tumor along the left bladder base and left sidewall extending all the way up to the left aspect of the dome. I switched out to the bipolar resectoscope, I was able to carefully resect this entire tumor down to the muscular layer with some muscular bites using intermittent cautery to take care of any perforating bleeders. I used Ellik to evacuate all the tumor and it was sent to Pathology. I finally was able to see the right ureteral orifice, but I could not see the left as most likely there was tumor in close proximity.   There was significant amount of varicosities along the large median lobe and BPH tissue that did cause some bleeding. I had to use a lot of intermittent cauteries to control this. The bladder was surveyed and found to be free of any injury. I then removed the cystoscope and placed a 24-Amharic 3-way with 30 mL of water in the balloon, and I started CBI with which the efflux was cleared. A digital rectal exam revealed a large prostate with no nodules and no pelvic masses. He tolerated the procedure well. No complications.       MD MARTIN Washburn/[email or web address]  D:  12/26/2019 12:51  T:  12/26/2019 21:21  JOB #:  [id number]  CC:  Iveth Kim MD       [facility]

## 2019-12-27 NOTE — PROGRESS NOTES
Verbal shift change report given to [address]  (oncoming nurse) by Yvrose Andrew (offgoing nurse). Report included the following information SBAR, Kardex, Intake/Output, MAR and Recent Results.

## 2019-12-27 NOTE — ROUTINE PROCESS
Pt left a [employer] power cord in room. Attempted to reach at number on file but it is no longer in service.  Will leave at home pod on [address]

## 2019-12-27 NOTE — PROGRESS NOTES
Pharmacy - Dosing     Medication ordered: Ambien 10 mg QHS PRN    Impression/Plan:   Ambien dose changed to 5 mg QHS prn per protocol for elderly patients >72years old     Thanks,  CHRISTIAN Grey

## 2019-12-27 NOTE — PROGRESS NOTES
General Surgery End of Shift Nursing Note    Bedside shift change report given to Evelyn Quesada  (oncoming nurse) by Bobby Brady RN   (offgoing nurse). Report included the following information SBAR, Kardex, Intake/Output, MAR and Recent Results. Shift worked:   7p-7a   Summary of shift:    Pt treated with PRN pain medication. CBI weaned.     Issues for physician to address:   none       Pain Management:  Current medication: tylenol, roxicodone  Patient states pain is manageable on current pain medication: YES    GI:    Current diet:  DIET REGULAR    Tolerating current diet: YES    Respiratory:    Incentive Spirometer at bedside: YES  Patient instructed on use: YES    Patient Safety:    Falls Score: 1    Nisha Salinas RN

## 2019-12-27 NOTE — PROGRESS NOTES
RHODA Plan:     *Home with family   *wife to transport pt home   *OBS letters signed and placed on pt chart    Patient is being discharged home today without any needs or concerns.   Follow-up appointment is on the AVS.    Corrinne Butler  Ext [phone]

## 2020-02-12 ENCOUNTER — ANESTHESIA (OUTPATIENT)
Dept: SURGERY | Age: 80
End: 2020-02-12
Payer: MEDICARE

## 2020-02-12 ENCOUNTER — HOSPITAL ENCOUNTER (OUTPATIENT)
Age: 80
Setting detail: OUTPATIENT SURGERY
Discharge: HOME | End: 2020-02-12
Attending: UROLOGY | Admitting: UROLOGY
Payer: MEDICARE

## 2020-02-12 ENCOUNTER — ANESTHESIA EVENT (OUTPATIENT)
Dept: SURGERY | Age: 80
End: 2020-02-12
Payer: MEDICARE

## 2020-02-12 VITALS
OXYGEN SATURATION: 95 % | TEMPERATURE: 98.5 F | HEART RATE: 70 BPM | RESPIRATION RATE: 16 BRPM | HEIGHT: 70 IN | BODY MASS INDEX: 25.22 KG/M2 | WEIGHT: 176.15 LBS | DIASTOLIC BLOOD PRESSURE: 55 MMHG | SYSTOLIC BLOOD PRESSURE: 94 MMHG

## 2020-02-12 DIAGNOSIS — N35.914 ANTERIOR URETHRAL STRICTURE: Primary | ICD-10-CM

## 2020-02-12 LAB
ALBUMIN SERPL-MCNC: 3.5 G/DL (ref 3.5–5)
ALBUMIN/GLOB SERPL: 1 {RATIO} (ref 1.1–2.2)
ALP SERPL-CCNC: 89 U/L (ref 45–117)
ALT SERPL-CCNC: 14 U/L (ref 12–78)
ANION GAP SERPL CALC-SCNC: 6 MMOL/L (ref 5–15)
AST SERPL-CCNC: 14 U/L (ref 15–37)
BASOPHILS # BLD: 0.1 K/UL (ref 0–0.1)
BASOPHILS NFR BLD: 1 % (ref 0–1)
BILIRUB SERPL-MCNC: 1.4 MG/DL (ref 0.2–1)
BUN SERPL-MCNC: 30 MG/DL (ref 6–20)
BUN/CREAT SERPL: 20 (ref 12–20)
CALCIUM SERPL-MCNC: 9 MG/DL (ref 8.5–10.1)
CHLORIDE SERPL-SCNC: 105 MMOL/L (ref 97–108)
CO2 SERPL-SCNC: 27 MMOL/L (ref 21–32)
CREAT SERPL-MCNC: 1.5 MG/DL (ref 0.7–1.3)
DIFFERENTIAL METHOD BLD: ABNORMAL
EOSINOPHIL # BLD: 0.1 K/UL (ref 0–0.4)
EOSINOPHIL NFR BLD: 1 % (ref 0–7)
ERYTHROCYTE [DISTWIDTH] IN BLOOD BY AUTOMATED COUNT: 21.8 % (ref 11.5–14.5)
GLOBULIN SER CALC-MCNC: 3.6 G/DL (ref 2–4)
GLUCOSE SERPL-MCNC: 96 MG/DL (ref 65–100)
HCT VFR BLD AUTO: 38.2 % (ref 36.6–50.3)
HGB BLD-MCNC: 11.6 G/DL (ref 12.1–17)
IMM GRANULOCYTES # BLD AUTO: 0 K/UL (ref 0–0.04)
IMM GRANULOCYTES NFR BLD AUTO: 0 % (ref 0–0.5)
LYMPHOCYTES # BLD: 0.9 K/UL (ref 0.8–3.5)
LYMPHOCYTES NFR BLD: 8 % (ref 12–49)
MCH RBC QN AUTO: 22.8 PG (ref 26–34)
MCHC RBC AUTO-ENTMCNC: 30.4 G/DL (ref 30–36.5)
MCV RBC AUTO: 75.2 FL (ref 80–99)
MONOCYTES # BLD: 1 K/UL (ref 0–1)
MONOCYTES NFR BLD: 9 % (ref 5–13)
NEUTS SEG # BLD: 9.5 K/UL (ref 1.8–8)
NEUTS SEG NFR BLD: 81 % (ref 32–75)
NRBC # BLD: 0 K/UL (ref 0–0.01)
NRBC BLD-RTO: 0 PER 100 WBC
PLATELET # BLD AUTO: 260 K/UL (ref 150–400)
PMV BLD AUTO: 9.5 FL (ref 8.9–12.9)
POTASSIUM SERPL-SCNC: 3.8 MMOL/L (ref 3.5–5.1)
PROT SERPL-MCNC: 7.1 G/DL (ref 6.4–8.2)
RBC # BLD AUTO: 5.08 M/UL (ref 4.1–5.7)
RBC MORPH BLD: ABNORMAL
SODIUM SERPL-SCNC: 138 MMOL/L (ref 136–145)
WBC # BLD AUTO: 11.6 K/UL (ref 4.1–11.1)

## 2020-02-12 PROCEDURE — 85025 COMPLETE CBC W/AUTO DIFF WBC: CPT

## 2020-02-12 PROCEDURE — 77030040922 HC BLNKT HYPOTHRM STRY -A

## 2020-02-12 PROCEDURE — 74011250637 HC RX REV CODE- 250/637: Performed by: UROLOGY

## 2020-02-12 PROCEDURE — 74011000250 HC RX REV CODE- 250: Performed by: NURSE ANESTHETIST, CERTIFIED REGISTERED

## 2020-02-12 PROCEDURE — 77030021163 HC TUBE CYSTO IRR ICUM -A: Performed by: UROLOGY

## 2020-02-12 PROCEDURE — 76010000160 HC OR TIME 0.5 TO 1 HR INTENSV-TIER 1: Performed by: UROLOGY

## 2020-02-12 PROCEDURE — 74011250636 HC RX REV CODE- 250/636: Performed by: NURSE ANESTHETIST, CERTIFIED REGISTERED

## 2020-02-12 PROCEDURE — 74011250637 HC RX REV CODE- 250/637

## 2020-02-12 PROCEDURE — 77030016637 HC BSKT URET RTVR1 BSC -C: Performed by: UROLOGY

## 2020-02-12 PROCEDURE — 80053 COMPREHEN METABOLIC PANEL: CPT

## 2020-02-12 PROCEDURE — 76210000006 HC OR PH I REC 0.5 TO 1 HR: Performed by: UROLOGY

## 2020-02-12 PROCEDURE — 74011250636 HC RX REV CODE- 250/636

## 2020-02-12 PROCEDURE — 77030040361 HC SLV COMPR DVT MDII -B: Performed by: UROLOGY

## 2020-02-12 PROCEDURE — 77030013079 HC BLNKT BAIR HGGR 3M -A: Performed by: ANESTHESIOLOGY

## 2020-02-12 PROCEDURE — 74011250636 HC RX REV CODE- 250/636: Performed by: UROLOGY

## 2020-02-12 PROCEDURE — 76210000021 HC REC RM PH II 0.5 TO 1 HR: Performed by: UROLOGY

## 2020-02-12 PROCEDURE — 77030018832 HC SOL IRR H20 ICUM -A: Performed by: UROLOGY

## 2020-02-12 PROCEDURE — 77030019908 HC STETH ESOPH SIMS -A: Performed by: ANESTHESIOLOGY

## 2020-02-12 PROCEDURE — 77030020143 HC AIRWY LARYN INTUB CGAS -A: Performed by: ANESTHESIOLOGY

## 2020-02-12 PROCEDURE — 76060000032 HC ANESTHESIA 0.5 TO 1 HR: Performed by: UROLOGY

## 2020-02-12 PROCEDURE — 36415 COLL VENOUS BLD VENIPUNCTURE: CPT

## 2020-02-12 PROCEDURE — 77030018846 HC SOL IRR STRL H20 ICUM -A: Performed by: UROLOGY

## 2020-02-12 PROCEDURE — 77030018836 HC SOL IRR NACL ICUM -A: Performed by: UROLOGY

## 2020-02-12 RX ORDER — ONDANSETRON 2 MG/ML
INJECTION INTRAMUSCULAR; INTRAVENOUS AS NEEDED
Status: DISCONTINUED | OUTPATIENT
Start: 2020-02-12 | End: 2020-02-12 | Stop reason: HOSPADM

## 2020-02-12 RX ORDER — PHENYLEPHRINE HCL IN 0.9% NACL 0.4MG/10ML
SYRINGE (ML) INTRAVENOUS AS NEEDED
Status: DISCONTINUED | OUTPATIENT
Start: 2020-02-12 | End: 2020-02-12 | Stop reason: HOSPADM

## 2020-02-12 RX ORDER — DEXAMETHASONE SODIUM PHOSPHATE 4 MG/ML
INJECTION, SOLUTION INTRA-ARTICULAR; INTRALESIONAL; INTRAMUSCULAR; INTRAVENOUS; SOFT TISSUE AS NEEDED
Status: DISCONTINUED | OUTPATIENT
Start: 2020-02-12 | End: 2020-02-12 | Stop reason: HOSPADM

## 2020-02-12 RX ORDER — HYDROCODONE BITARTRATE AND ACETAMINOPHEN 5; 325 MG/1; MG/1
1 TABLET ORAL
Qty: 12 TAB | Refills: 0 | Status: SHIPPED | OUTPATIENT
Start: 2020-02-12 | End: 2020-02-15

## 2020-02-12 RX ORDER — ONDANSETRON 2 MG/ML
INJECTION INTRAMUSCULAR; INTRAVENOUS
Status: COMPLETED
Start: 2020-02-12 | End: 2020-02-12

## 2020-02-12 RX ORDER — MIDAZOLAM HYDROCHLORIDE 1 MG/ML
INJECTION, SOLUTION INTRAMUSCULAR; INTRAVENOUS AS NEEDED
Status: DISCONTINUED | OUTPATIENT
Start: 2020-02-12 | End: 2020-02-12 | Stop reason: HOSPADM

## 2020-02-12 RX ORDER — FENTANYL CITRATE 50 UG/ML
INJECTION, SOLUTION INTRAMUSCULAR; INTRAVENOUS AS NEEDED
Status: DISCONTINUED | OUTPATIENT
Start: 2020-02-12 | End: 2020-02-12 | Stop reason: HOSPADM

## 2020-02-12 RX ORDER — SODIUM CHLORIDE, SODIUM LACTATE, POTASSIUM CHLORIDE, CALCIUM CHLORIDE 600; 310; 30; 20 MG/100ML; MG/100ML; MG/100ML; MG/100ML
25 INJECTION, SOLUTION INTRAVENOUS CONTINUOUS
Status: DISCONTINUED | OUTPATIENT
Start: 2020-02-12 | End: 2020-02-12 | Stop reason: HOSPADM

## 2020-02-12 RX ORDER — HYDROCODONE BITARTRATE AND ACETAMINOPHEN 5; 325 MG/1; MG/1
TABLET ORAL
Status: COMPLETED
Start: 2020-02-12 | End: 2020-02-12

## 2020-02-12 RX ORDER — PROPOFOL 10 MG/ML
INJECTION, EMULSION INTRAVENOUS AS NEEDED
Status: DISCONTINUED | OUTPATIENT
Start: 2020-02-12 | End: 2020-02-12 | Stop reason: HOSPADM

## 2020-02-12 RX ORDER — PHENAZOPYRIDINE HYDROCHLORIDE 100 MG/1
TABLET, FILM COATED ORAL
Status: COMPLETED
Start: 2020-02-12 | End: 2020-02-12

## 2020-02-12 RX ORDER — HYDROCODONE BITARTRATE AND ACETAMINOPHEN 5; 325 MG/1; MG/1
1 TABLET ORAL ONCE
Status: COMPLETED | OUTPATIENT
Start: 2020-02-12 | End: 2020-02-12

## 2020-02-12 RX ORDER — PHENAZOPYRIDINE HYDROCHLORIDE 100 MG/1
200 TABLET, FILM COATED ORAL ONCE
Status: COMPLETED | OUTPATIENT
Start: 2020-02-12 | End: 2020-02-12

## 2020-02-12 RX ORDER — PHENAZOPYRIDINE HYDROCHLORIDE 200 MG/1
200 TABLET, FILM COATED ORAL
Qty: 12 TAB | Refills: 0 | Status: SHIPPED | OUTPATIENT
Start: 2020-02-12 | End: 2020-02-15

## 2020-02-12 RX ORDER — CEFUROXIME AXETIL 500 MG/1
500 TABLET ORAL 2 TIMES DAILY
Qty: 10 TAB | Refills: 0 | Status: SHIPPED | OUTPATIENT
Start: 2020-02-12 | End: 2020-02-17

## 2020-02-12 RX ORDER — ONDANSETRON 2 MG/ML
4 INJECTION INTRAMUSCULAR; INTRAVENOUS ONCE
Status: COMPLETED | OUTPATIENT
Start: 2020-02-12 | End: 2020-02-12

## 2020-02-12 RX ORDER — GLYCOPYRROLATE 0.2 MG/ML
INJECTION INTRAMUSCULAR; INTRAVENOUS AS NEEDED
Status: DISCONTINUED | OUTPATIENT
Start: 2020-02-12 | End: 2020-02-12 | Stop reason: HOSPADM

## 2020-02-12 RX ORDER — LIDOCAINE HYDROCHLORIDE 20 MG/ML
INJECTION, SOLUTION EPIDURAL; INFILTRATION; INTRACAUDAL; PERINEURAL AS NEEDED
Status: DISCONTINUED | OUTPATIENT
Start: 2020-02-12 | End: 2020-02-12 | Stop reason: HOSPADM

## 2020-02-12 RX ADMIN — PHENYLEPHRINE HYDROCHLORIDE 80 MCG/MIN: 10 INJECTION INTRAVENOUS at 14:54

## 2020-02-12 RX ADMIN — Medication 80 MCG: at 14:50

## 2020-02-12 RX ADMIN — ONDANSETRON 4 MG: 2 INJECTION INTRAMUSCULAR; INTRAVENOUS at 16:00

## 2020-02-12 RX ADMIN — FENTANYL CITRATE 25 MCG: 50 INJECTION, SOLUTION INTRAMUSCULAR; INTRAVENOUS at 15:11

## 2020-02-12 RX ADMIN — PHENAZOPYRIDINE HYDROCHLORIDE 200 MG: 100 TABLET, FILM COATED ORAL at 16:00

## 2020-02-12 RX ADMIN — DEXAMETHASONE SODIUM PHOSPHATE 8 MG: 4 INJECTION, SOLUTION INTRAMUSCULAR; INTRAVENOUS at 14:58

## 2020-02-12 RX ADMIN — Medication 120 MCG: at 14:47

## 2020-02-12 RX ADMIN — Medication 120 MCG: at 14:54

## 2020-02-12 RX ADMIN — SODIUM CHLORIDE, SODIUM LACTATE, POTASSIUM CHLORIDE, AND CALCIUM CHLORIDE 25 ML/HR: 600; 310; 30; 20 INJECTION, SOLUTION INTRAVENOUS at 13:54

## 2020-02-12 RX ADMIN — MIDAZOLAM HYDROCHLORIDE 2 MG: 1 INJECTION INTRAMUSCULAR; INTRAVENOUS at 14:34

## 2020-02-12 RX ADMIN — Medication 3 AMPULE: at 13:57

## 2020-02-12 RX ADMIN — PROPOFOL 150 MG: 10 INJECTION, EMULSION INTRAVENOUS at 14:43

## 2020-02-12 RX ADMIN — LIDOCAINE HYDROCHLORIDE 80 MG: 20 INJECTION, SOLUTION EPIDURAL; INFILTRATION; INTRACAUDAL; PERINEURAL at 14:40

## 2020-02-12 RX ADMIN — ONDANSETRON HYDROCHLORIDE 4 MG: 2 INJECTION, SOLUTION INTRAMUSCULAR; INTRAVENOUS at 14:58

## 2020-02-12 RX ADMIN — GLYCOPYRROLATE 0.1 MG: 0.2 INJECTION, SOLUTION INTRAMUSCULAR; INTRAVENOUS at 14:54

## 2020-02-12 RX ADMIN — CEFTRIAXONE SODIUM 1 G: 1 INJECTION, POWDER, FOR SOLUTION INTRAMUSCULAR; INTRAVENOUS at 14:50

## 2020-02-12 RX ADMIN — PHENAZOPYRIDINE HYDROCHLORIDE 200 MG: 100 TABLET ORAL at 16:00

## 2020-02-12 RX ADMIN — GLYCOPYRROLATE 0.1 MG: 0.2 INJECTION, SOLUTION INTRAMUSCULAR; INTRAVENOUS at 15:03

## 2020-02-12 RX ADMIN — FENTANYL CITRATE 25 MCG: 50 INJECTION, SOLUTION INTRAMUSCULAR; INTRAVENOUS at 15:07

## 2020-02-12 RX ADMIN — Medication 80 MCG: at 14:44

## 2020-02-12 RX ADMIN — HYDROCODONE BITARTRATE AND ACETAMINOPHEN 1 TABLET: 5; 325 TABLET ORAL at 16:00

## 2020-02-12 RX ADMIN — FENTANYL CITRATE 25 MCG: 50 INJECTION, SOLUTION INTRAMUSCULAR; INTRAVENOUS at 14:48

## 2020-02-12 RX ADMIN — FENTANYL CITRATE 25 MCG: 50 INJECTION, SOLUTION INTRAMUSCULAR; INTRAVENOUS at 14:52

## 2020-02-12 RX ADMIN — ONDANSETRON HYDROCHLORIDE 4 MG: 2 INJECTION, SOLUTION INTRAMUSCULAR; INTRAVENOUS at 16:00

## 2020-02-12 NOTE — DISCHARGE INSTRUCTIONS
Patient Education      Follow-up Friday @ 8:40 am for lazaro removal in [facility]    Urethral Dilation: What to Expect at [facility]    Urethral dilation is a procedure to stretch the sides of the urethra. The urethra is the tube that carries urine from your bladder to outside your body. Sometimes scar tissue narrows the urethra. This is called urethral stricture. When the urethra is narrow, it's hard for urine to pass through and out of your body. Dilation often can relieve symptoms by widening the urethra. To open the narrowed part, the doctor used one or more thin tools to stretch the stricture. He or she then may have placed a thin, soft tube (catheter) in the urethra to drain urine and to keep the urethra open. The catheter is removed after a few days. After dilation, urine should be able to pass more freely from your bladder. Your doctor may have shown you how to do dilation at home. After dilation, your urethra may be sore at first. It may burn when you urinate. You may feel the need to urinate more often, and you may have some blood in your urine. These symptoms should get better in 1 or 2 days. You will probably be able to go back to most of your usual activities in 1 or 2 days. Drink extra water for the next few days. This care sheet gives you a general idea about how long it will take for you to recover. But each person recovers at a different pace. Follow the steps below to get better as quickly as possible. How can you care for yourself at home? Activity    · Rest when you feel tired.     · Be active. Walking is a good choice.     · Ask your doctor when you can drive again.     · You will probably need to take 1 to 2 days off from work. It depends on the type of work you do and how you feel.     · Ask your doctor when it is okay for you to have sex. Diet    · You can eat your normal diet.  If your stomach is upset, try bland, low-fat foods like plain rice, broiled chicken, toast, and yogurt.     · If your bowel movements are not regular right after dilation, try to avoid constipation and straining. Drink plenty of water. Your doctor may suggest fiber, a stool softener, or a mild laxative. Medicines    · Be safe with medicines. Take medicines exactly as prescribed. Call your doctor if you are having a problem with your medicine. You will get more details on the specific medicines your doctor prescribes.     · If you take aspirin or some other blood thinner, be sure to talk to your doctor. He or she will tell you if and when to start taking this medicine again. Make sure that you understand exactly what your doctor wants you to do. Follow-up care is a key part of your treatment and safety. Be sure to make and go to all appointments, and call your doctor if you are having problems. It's also a good idea to know your test results and keep a list of the medicines you take. When should you call for help? Call 911 anytime you think you may need emergency care. For example, call if:    · You passed out (lost consciousness).     · You have chest pain, are short of breath, or cough up blood.    Call the doctor now or seek immediate medical care if:    · You have pain that does not get better after you take pain medicine.     · You cannot pass urine.     · You pass blood clots in your urine.     · You are too sick to your stomach to drink any fluids.     · You have symptoms of a urinary tract infection. These may include:  ? Still having pain or burning when you urinate about 2 days after the procedure. ? Pain in the flank, which is just below the rib cage and above the waist on either side of the back. ? Still having blood in your urine about 2 days after the procedure. Where can you learn more? Go to http://milton-nadege.info/. Enter U130 in the search box to learn more about \"Urethral Dilation: What to Expect at Home. \"  Current as of: December 19, 2018  Content Version: 12.2  © 4854-1518 [employer]. Care instructions adapted under license by [employer] (which disclaims liability or warranty for this information). If you have questions about a medical condition or this instruction, always ask your healthcare professional. [address] any warranty or liability for your use of this information. DISCHARGE SUMMARY from Nurse    PATIENT INSTRUCTIONS:    After general anesthesia or intravenous sedation, for 24 hours or while taking prescription Narcotics:  · Limit your activities  · Do not drive and operate hazardous machinery  · Do not make important personal or business decisions  · Do  not drink alcoholic beverages  · If you have not urinated within 8 hours after discharge, please contact your surgeon on call. Report the following to your surgeon:  · Excessive pain, swelling, redness or odor of or around the surgical area  · Temperature over 100.5  · Nausea and vomiting lasting longer than 4 hours or if unable to take medications  · Any signs of decreased circulation or nerve impairment to extremity: change in color, persistent  numbness, tingling, coldness or increase pain  · Any questions      *  Please give a list of your current medications to your Primary Care Provider. *  Please update this list whenever your medications are discontinued, doses are      changed, or new medications (including over-the-counter products) are added. *  Please carry medication information at all times in case of emergency situations. These are general instructions for a healthy lifestyle:    No smoking/ No tobacco products/ Avoid exposure to second hand smoke  Surgeon General's Warning:  Quitting smoking now greatly reduces serious risk to your health.     Obesity, smoking, and sedentary lifestyle greatly increases your risk for illness    A healthy diet, regular physical exercise & weight monitoring are important for maintaining a healthy lifestyle    You may be retaining fluid if you have a history of heart failure or if you experience any of the following symptoms:  Weight gain of 3 pounds or more overnight or 5 pounds in a week, increased swelling in our hands or feet or shortness of breath while lying flat in bed. Please call your doctor as soon as you notice any of these symptoms; do not wait until your next office visit. The discharge information has been reviewed with the patient and spouse. The patient and spouse verbalized understanding. Discharge medications reviewed with the patient and spouse and appropriate educational materials and side effects teaching were provided. ___________________________________________________________________________________________________________________________________    Patient Education      Hydrocodone/Acetaminophen (Vicodin, Norco, Lortab) - (By mouth)   Why this medicine is used:   Treats pain. Contact a nurse or doctor right away if you have:  · Blistering, peeling, red skin rash  · Fast or slow heartbeat, shallow breathing, blue lips, fingernails, or skin  · Anxiety, restlessness, muscle spasms, twitching, seeing or hearing things that are not there  · Dark urine or pale stools, yellow skin or eyes  · Extreme weakness, sweating, seizures, cold or clammy skin  · Lightheadedness, dizziness, fainting, fever, sweating     Common side effects:  · Constipation, nausea, vomiting, loss of appetite, stomach pain  · Tiredness or sleepiness  © 2017 [facility] Information is for End User's use only and may not be sold, redistributed or otherwise used for commercial purposes. Patient Education      Phenazopyridine (Pyridium, Pyridiate, Azo Standard) - (By mouth)   Why this medicine is used:   Relieves symptoms caused by urinary tract infections and other urinary problems.   Contact a nurse or doctor right away if you have:  · Skin rash or hives  · Trouble breathing  · Yellowing of the skin or eyes     Common side effects:  · Indigestion, stomach upset  · Dizziness  · Headache  © 2017 [facility] Information is for End User's use only and may not be sold, redistributed or otherwise used for commercial purposes. Patient Education      Cefuroxime (Ceftin) - (By mouth)   Why this medicine is used:   Treats infections. Contact a nurse or doctor right away if you have:  · Blistering, peeling, or red skin rash  · Severe or bloody diarrhea     Common side effects:  · Nausea  · Bad taste in your mouth  © 2017 [address] is for End User's use only and may not be sold, redistributed or otherwise used for commercial purposes. Patient Education        Learning About Urinary Catheter Care to Prevent Infection  What is a urinary catheter? A urinary catheter is a flexible plastic tube used to drain urine from your bladder when you can't urinate on your own. The catheter allows urine to drain from the bladder into a bag. Two types of drainage bags may be used with a urinary catheter. · A bedside bag is a large bag that you can hang on the side of your bed or on a chair. You can use it overnight or anytime you will be sitting or lying down for a long time. · A leg bag is a small bag that you can use during the day. It is usually attached to your thigh or calf and hidden under your clothes. Having a urinary catheter increases your risk of getting a urinary tract infection. Germs may get on the catheter and cause an infection in your bladder or kidneys. The longer you have a catheter, the more likely it is that you will get an infection. You can help prevent this problem with good hygiene and careful handling of your catheter and drainage bags. How can you help prevent infection? Take care to be clean  · Always wash your hands well before and after you handle your catheter.   · Clean the skin around the catheter twice a day using soap and water. Dry with a clean towel afterward. You can shower with your catheter and drainage bag in place unless your doctor told you not to. · When you clean around the catheter, check the surrounding skin for signs of infection. Look for things like pus or irritated, swollen, red, or tender skin around the catheter. Be careful with your drainage bag  · Always keep the drainage bag below the level of your bladder. This will help keep urine from flowing back into your bladder. · Check often to see that urine is flowing through the catheter into the drainage bag. · Empty the drainage bag when it is half full. This will keep it from overflowing or backing up. · When you empty the drainage bag, do not let the tubing or drain spout touch anything. Be careful with your catheter  · Do not unhook the catheter from the drain tube. That could let germs get into the tube. · Make sure that the catheter tubing does not get twisted or kinked. · Do not tug or pull on the catheter. And make sure that the drainage bag does not drag or pull on the catheter. · Do not put powder or lotion on the skin around the catheter. · Talk with your doctor about your options for sexual intercourse while wearing a catheter. How do you empty a urine drainage bag? If your doctor has asked you to keep a record, write down the amount of urine in the bag before you empty it. Wash your hands before and after you touch the bag. 1. Remove the drain spout from its sleeve at the bottom of the drainage bag.  2. Open the valve on the drain spout. Let the urine flow out into the toilet or a container. Be careful not to let the tubing or drain spout touch anything. 3. After you empty the bag, close the valve. Then put the drain spout back into its sleeve at the bottom of the collection bag. When should you call for help? Call your doctor now or seek immediate medical care if:  · You have symptoms of a urinary infection.  These may include:  ? Pain or burning when you urinate. ? A frequent need to urinate without being able to pass much urine. ? Pain in the flank, which is just below the rib cage and above the waist on either side of the back. ? Blood in your urine. ? A fever. · Your urine smells bad. · You see large blood clots in your urine. · No urine or very little urine is flowing into the bag for 4 or more hours. Watch closely for changes in your health, and be sure to contact your doctor if:  · The area around the catheter becomes irritated, swollen, red, or tender, or there is pus draining from it. · Urine is leaking from the place where the catheter enters your body. Follow-up care is a key part of your treatment and safety. Be sure to make and go to all appointments, and call your doctor if you are having problems. It's also a good idea to know your test results and keep a list of the medicines you take. Where can you learn more? Go to http://milton-nadege.info/. Enter U010 in the search box to learn more about \"Learning About Urinary Catheter Care to Prevent Infection. \"  Current as of: December 19, 2018  Content Version: 12.2  © 8437-0409 [employer]. Care instructions adapted under license by [employer] (which disclaims liability or warranty for this information). If you have questions about a medical condition or this instruction, always ask your healthcare professional. [place] [address] any warranty or liability for your use of this information.

## 2020-02-12 NOTE — ROUTINE PROCESS
TRANSFER - OUT REPORT: 
 
Verbal report given to JANIA Benson RN(name) on [facility]  being transferred to Phase II(unit) for routine progression of care Report consisted of patients Situation, Background, Assessment and  
Recommendations(SBAR). Information from the following report(s) OR Summary was reviewed with the receiving nurse. Opportunity for questions and clarification was provided. Patient transported with: 
 Registered Nurse

## 2020-02-12 NOTE — ANESTHESIA PREPROCEDURE EVALUATION
Relevant Problems   No relevant active problems       Anesthetic History   No history of anesthetic complications            Review of Systems / Medical History  Patient summary reviewed, nursing notes reviewed and pertinent labs reviewed    Pulmonary  Within defined limits                 Neuro/Psych   Within defined limits           Cardiovascular    Hypertension        Dysrhythmias : atrial fibrillation  Pacemaker      Comments: EKG AV paced   GI/Hepatic/Renal     GERD    Renal disease: CRI  PUD     Endo/Other        Arthritis     Other Findings   Comments: gout           Physical Exam    Airway  Mallampati: II  TM Distance: 4 - 6 cm  Neck ROM: normal range of motion   Mouth opening: Normal     Cardiovascular    Rhythm: irregular  Rate: normal         Dental  No notable dental hx       Pulmonary  Breath sounds clear to auscultation               Abdominal  Abdominal exam normal       Other Findings            Anesthetic Plan    ASA: 3  Anesthesia type: general          Induction: Intravenous  Anesthetic plan and risks discussed with: Patient

## 2020-02-12 NOTE — PROGRESS NOTES
Patient discharged to home. Iv removed. Discharge instructions, scripts, and medication education done with patient and spouse. Leg bag and supplies given to patient at discharge. Steward demo/education done with patient at discharge.

## 2020-02-12 NOTE — H&P
Patient of Dr. Bev Lobato recent diagnosis resection low-grade TA lesion, BPH/prostatomegaly (108 g gland) not on medications, elevated PSA with negative biopsy 7/2017 when PSA was 6. At last visit 12/30/2019 he was sent to the emergency room for evaluation shortness of breath and difficulty ambulating. Bladder scan today shows 525 notes hourly urination, extreme urgency, occasional urge related dribbling, burning and spraying stream.  16 coudé failed with dense fossa navicularis stricture palpated. I failed with pretty forceful attempts at placing a female catheter for dilation. Patient is on Eliquis. Last procedure had to happen the hospital due to pacemaker and cardiac surgery earlier in the year for \"hole in the heart\"    PAST MEDICAL HISTORY:    Allergies: No known allergies. DENIES: Latex, Shellfish, X-Ray Dye, Iodine. Medications: TAMSULOSIN HCL 0.4 MG ORAL CAPSULE 0.4mg PO qDay; Route: ORAL [BMN], CIPROFLOXACIN  MG ORAL TABLET 1 po bid for 3 days; Route: ORAL, CEFUROXIME AXETIL 500 MG ORAL TABLET 1/2 tab po BID; Route: ORAL, LASIX 20 MG ORAL TABLET twic daily; Route: ORAL, ASPIRIN 325 MG ORAL TABLET once daily; Route: ORAL, CARVEDILOL 6.25 MG ORAL TABLET 2 x nightly; Route: ORAL, LEXAPRO 10 MG ORAL TABLET 1 daily; Route: ORAL, SIMVASTATIN 20 MG ORAL TABLET daily; Route: ORAL, LORAZEPAM 0.5 MG ORAL TABLET as needed; Route: ORAL, ELIQUIS 5 MG ORAL TABLET twice daily; Route: ORAL. Illnesses: Pacemaker/Defibrillator and High Blood Pressure. DENIES: Heart Disease, Lung Disease, Diabetes, Bowel Problems, Stroke/Seizure, Kidney Problems, Bleeding Problems, HIV, Hepatitis, or Cancer. Surgeries: Gallbladder Surgery. Family History: DENIES: Prostate cancer, Kidney cancer, Kidney disease, Kidney stones. Social History: Full Time. Other. Smoking status: former smoker. Does not drink alcohol. System Review: Admits to: Shortness of Breath, Constipation, Impaired Sex Drive, and Easy Bleeding. DENIES: Unexplained Weight Loss, Dry Eyes, Dry Mouth, Leg Swelling, Involuntary Urine Loss, Lower Extremity Weakness, Dry Skin, Difficulty Walking, Psychiatric Problems, Rash. URINALYSIS from Voided specimen, sent for culture  Urine Dip: pH: 6.0, Bld: ++, LE: ++, Nit: Pos, Prot: Trace, Ket: Neg, Gluc: Neg  Urine Micro: WBC: TNTC, RBC: 1-2, Bacteria: TNTC    POST-VOID RESIDUAL  US PVR (02/11/2020):  525 ccs     PSA HISTORY  2.47 ng/ml on 10/02/2018  6.03 ng/ml on 06/26/2017  4.06 ng/ml on 06/23/2016    Prescription(s) Today: TAMSULOSIN HCL 0.4 MG ORAL CAPSULE (TAMSULOSIN HCL) 0.4mg PO qDay  #30[Capsule] x 12 [BMN],  02/11/2020, Pito Sierra MD  CIPROFLOXACIN  MG ORAL TABLET (CIPROFLOXACIN HCL) 1 po bid for 3 days  #6[Tablet] x 1,  02/11/2020, Breanne Lal MD    IMPRESSION:    1. BPH W/O URINARY OBSTRUCTION (ICD-600.00): Initiate tamsulosin, voiding trial early next week. 2. HISTORY OF BLADDER CANCER (ICD-V10.51) - New: Low-grade TA tumor status post recent resection. 3. URINARY RETENTION (ICD-788.20) - New    4. URINARY TRACT INFECTION (ICD-599.0): Urine for culture. Empiric Cipro x3 days with refill if appropriate. 5. POSTPROCEDURAL FOSSA NAVICULARIS URETHRAL STRICTURE - New: Failed office Steward. He will hold on further Eliquis use. Prompt cystoscopy urethral dilation versus meatotomy.

## 2020-02-12 NOTE — ROUTINE PROCESS
TRANSFER - IN REPORT: 
 
Verbal report received from FRANCIS Moran RN(name) on Chestine Álvaro  being received from OR(unit) for routine post - op Report consisted of patients Situation, Background, Assessment and  
Recommendations(SBAR). Information from the following report(s) OR Summary was reviewed with the receiving nurse. Opportunity for questions and clarification was provided. Assessment completed upon patients arrival to unit and care assumed.

## 2020-02-13 NOTE — OP NOTES
[facility]  OPERATIVE REPORT    Name:  Cristino Corona  MR#:  [id number]  :  1940  ACCOUNT #:  [de-identified]  DATE OF SERVICE:  2020      PREOPERATIVE DIAGNOSIS:  Meatal stenosis. 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 PROCEDURE PERFORMED:  Meatal dilation to 32-Burundian with cystoscopic examination and fulguration of bladder neck varices. SURGEON:  Ankita Beard MD.    ASSISTANT:  None. ANESTHESIA:  General.    COMPLICATIONS:  None. SPECIMENS REMOVED:  None. IMPLANTS:  20-Burundian two-way Steward. ESTIMATED BLOOD LOSS:  minimal.    PROCEDURE:  After informed consent, the patient received preoperative antibiotics and placed on operating table in supine position. After adequate induction of general anesthetic, he was placed in the lithotomy position. All pressure points were carefully padded. The penis was prepped and draped in sterile fashion. A full time-out was accomplished. The meatus was dilated from the 12-Burundian to a 34-Burundian with female sounds with no difficulty. I then took a look in with a 21-Burundian cystoscope. Previous resection site of bladder was noted. He had bilobar hyperplasia with lot of bladder neck varices that were oozing. I used the Bugbee to fulgurate these and obtained hemostasis. The bladder was surveyed and found to be free of any injury. The scope was removed and 20-Burundian two-way Steward was placed with 10 mL of water in the balloon with efflux of clear pink irrigant. He tolerated the procedure well with no complications.         MD MARTIN Cisneros/[email or web address]  D:  2020 15:34  T:  2020 20:41  JOB #:  [id number]  CC:  [address] [place] [address]

## 2020-02-28 ENCOUNTER — HOSPITAL ENCOUNTER (EMERGENCY)
Age: 80
Discharge: HOME | End: 2020-02-28
Attending: EMERGENCY MEDICINE
Payer: MEDICARE

## 2020-02-28 ENCOUNTER — APPOINTMENT (OUTPATIENT)
Dept: CT IMAGING | Age: 80
End: 2020-02-28
Attending: EMERGENCY MEDICINE
Payer: MEDICARE

## 2020-02-28 VITALS
HEART RATE: 70 BPM | OXYGEN SATURATION: 95 % | DIASTOLIC BLOOD PRESSURE: 87 MMHG | RESPIRATION RATE: 18 BRPM | HEIGHT: 70 IN | TEMPERATURE: 97.8 F | BODY MASS INDEX: 25.25 KG/M2 | SYSTOLIC BLOOD PRESSURE: 132 MMHG | WEIGHT: 176.37 LBS

## 2020-02-28 DIAGNOSIS — R10.9 FLANK PAIN: Primary | ICD-10-CM

## 2020-02-28 LAB
ALBUMIN SERPL-MCNC: 3.7 G/DL (ref 3.5–5)
ALBUMIN/GLOB SERPL: 0.9 {RATIO} (ref 1.1–2.2)
ALP SERPL-CCNC: 121 U/L (ref 45–117)
ALT SERPL-CCNC: 25 U/L (ref 12–78)
ANION GAP SERPL CALC-SCNC: 9 MMOL/L (ref 5–15)
APPEARANCE UR: CLEAR
AST SERPL-CCNC: 18 U/L (ref 15–37)
BACTERIA URNS QL MICRO: NEGATIVE /HPF
BASOPHILS # BLD: 0.1 K/UL (ref 0–0.1)
BASOPHILS NFR BLD: 2 % (ref 0–1)
BILIRUB SERPL-MCNC: 0.5 MG/DL (ref 0.2–1)
BILIRUB UR QL: NEGATIVE
BUN SERPL-MCNC: 32 MG/DL (ref 6–20)
BUN/CREAT SERPL: 22 (ref 12–20)
CALCIUM SERPL-MCNC: 9.1 MG/DL (ref 8.5–10.1)
CHLORIDE SERPL-SCNC: 103 MMOL/L (ref 97–108)
CO2 SERPL-SCNC: 32 MMOL/L (ref 21–32)
COLOR UR: ABNORMAL
COMMENT, HOLDF: NORMAL
CREAT SERPL-MCNC: 1.45 MG/DL (ref 0.7–1.3)
DIFFERENTIAL METHOD BLD: ABNORMAL
EOSINOPHIL # BLD: 0.2 K/UL (ref 0–0.4)
EOSINOPHIL NFR BLD: 3 % (ref 0–7)
EPITH CASTS URNS QL MICRO: ABNORMAL /LPF
ERYTHROCYTE [DISTWIDTH] IN BLOOD BY AUTOMATED COUNT: 18.7 % (ref 11.5–14.5)
GLOBULIN SER CALC-MCNC: 4 G/DL (ref 2–4)
GLUCOSE SERPL-MCNC: 88 MG/DL (ref 65–100)
GLUCOSE UR STRIP.AUTO-MCNC: NEGATIVE MG/DL
HCT VFR BLD AUTO: 41.1 % (ref 36.6–50.3)
HGB BLD-MCNC: 12.1 G/DL (ref 12.1–17)
HGB UR QL STRIP: ABNORMAL
IMM GRANULOCYTES # BLD AUTO: 0 K/UL (ref 0–0.04)
IMM GRANULOCYTES NFR BLD AUTO: 0 % (ref 0–0.5)
KETONES UR QL STRIP.AUTO: NEGATIVE MG/DL
LEUKOCYTE ESTERASE UR QL STRIP.AUTO: ABNORMAL
LYMPHOCYTES # BLD: 1.3 K/UL (ref 0.8–3.5)
LYMPHOCYTES NFR BLD: 17 % (ref 12–49)
MCH RBC QN AUTO: 22.4 PG (ref 26–34)
MCHC RBC AUTO-ENTMCNC: 29.4 G/DL (ref 30–36.5)
MCV RBC AUTO: 76.1 FL (ref 80–99)
MONOCYTES # BLD: 0.6 K/UL (ref 0–1)
MONOCYTES NFR BLD: 8 % (ref 5–13)
NEUTS SEG # BLD: 5.6 K/UL (ref 1.8–8)
NEUTS SEG NFR BLD: 70 % (ref 32–75)
NITRITE UR QL STRIP.AUTO: NEGATIVE
NRBC # BLD: 0 K/UL (ref 0–0.01)
NRBC BLD-RTO: 0 PER 100 WBC
PH UR STRIP: 6 [PH] (ref 5–8)
PLATELET # BLD AUTO: 362 K/UL (ref 150–400)
PMV BLD AUTO: 8.8 FL (ref 8.9–12.9)
POTASSIUM SERPL-SCNC: 3.9 MMOL/L (ref 3.5–5.1)
PROT SERPL-MCNC: 7.7 G/DL (ref 6.4–8.2)
PROT UR STRIP-MCNC: NEGATIVE MG/DL
RBC # BLD AUTO: 5.4 M/UL (ref 4.1–5.7)
RBC #/AREA URNS HPF: ABNORMAL /HPF (ref 0–5)
SAMPLES BEING HELD,HOLD: NORMAL
SODIUM SERPL-SCNC: 144 MMOL/L (ref 136–145)
SP GR UR REFRACTOMETRY: 1.01 (ref 1–1.03)
UR CULT HOLD, URHOLD: NORMAL
UROBILINOGEN UR QL STRIP.AUTO: 0.2 EU/DL (ref 0.2–1)
WBC # BLD AUTO: 7.9 K/UL (ref 4.1–11.1)
WBC URNS QL MICRO: ABNORMAL /HPF (ref 0–4)

## 2020-02-28 PROCEDURE — 80053 COMPREHEN METABOLIC PANEL: CPT

## 2020-02-28 PROCEDURE — 99284 EMERGENCY DEPT VISIT MOD MDM: CPT

## 2020-02-28 PROCEDURE — 74176 CT ABD & PELVIS W/O CONTRAST: CPT

## 2020-02-28 PROCEDURE — 81001 URINALYSIS AUTO W/SCOPE: CPT

## 2020-02-28 PROCEDURE — 36415 COLL VENOUS BLD VENIPUNCTURE: CPT

## 2020-02-28 PROCEDURE — 85025 COMPLETE CBC W/AUTO DIFF WBC: CPT

## 2020-02-28 RX ORDER — NITROFURANTOIN 25; 75 MG/1; MG/1
100 CAPSULE ORAL 2 TIMES DAILY
COMMUNITY

## 2020-02-28 NOTE — ED PROVIDER NOTES
The history is provided by the patient and the spouse. A  was used. Flank Pain    This is a new problem. The current episode started yesterday. The problem has not changed since onset. The problem occurs constantly. Patient reports not work related injury. The pain is associated with no known injury. The pain is present in the left side. The quality of the pain is described as aching. The pain does not radiate. The pain is at a severity of 8/10. The pain is severe. Associated symptoms include abdominal pain and pelvic pain. Pertinent negatives include no chest pain, no fever, no numbness, no weight loss, no headaches, no abdominal swelling, no bowel incontinence, no perianal numbness, no bladder incontinence, no dysuria, no leg pain, no paresthesias, no paresis, no tingling and no weakness. He has tried nothing for the symptoms. The treatment provided no relief. Risk factors include pyelonephritis and a history of cancer.         Past Medical History:   Diagnosis Date    Anemia     Arthritis     bilateral hands    Atrial fibrillation ([address] 2013    resolved 2019    CAD (coronary artery disease)     PT DENIES HX OF CAD    Chronic kidney disease     CKD IIIA-Dr. Helene Dickey    Chronic pain     Elevated serum creatinine 6/7/2019    GERD (gastroesophageal reflux disease)     Gout     Heart failure (HCC)     diastolic heart failure    Hypertension     CONTROLLED BY MEDS    IBS (irritable bowel syndrome)     Ill-defined condition 01/2019    cardioversion    Ill-defined condition 2019    iron infussions x3     Ill-defined condition 2019    pneumonia hx    Pacemaker     PUD (peptic ulcer disease)     hx of    S/P ablation of atrial fibrillation        Past Surgical History:   Procedure Laterality Date    CARDIAC SURG PROCEDURE UNLIST  2019    cardiac ablation-Dr. Zoltan Mckeon, maze procedure-Dr. Lupillo Mitchell COLONOSCOPY N/A 2/13/2019    COLONOSCOPY performed by Sepideh Levy MD at [facility] END[facility]COPY    HX GI      COLONOSCOPY X 3    HX HEENT      TONSILLECTOMY // PT UNSURE    HX HERNIA REPAIR Left     HX LAP CHOLECYSTECTOMY      HX PACEMAKER  13    HX UROLOGICAL      bladder surgery         Family History:   Problem Relation Age of Onset    Heart Disease Mother     Hypertension Mother     Heart Disease Father     Hypertension Father     Heart Disease Brother     No Known Problems Brother     No Known Problems Son     No Known Problems Son     No Known Problems Daughter     Anesth Problems Neg Hx        Social History     Socioeconomic History    Marital status:      Spouse name: Not on file    Number of children: Not on file    Years of education: Not on file    Highest education level: Not on file   Occupational History    Not on file   Social Needs    Financial resource strain: Not on file    Food insecurity:     Worry: Not on file     Inability: Not on file    Transportation needs:     Medical: Not on file     Non-medical: Not on file   Tobacco Use    Smoking status: Former Smoker     Packs/day: 0.50     Last attempt to quit:      Years since quittin.1    Smokeless tobacco: Never Used    Tobacco comment: pipe   Substance and Sexual Activity    Alcohol use: No    Drug use: No     Comment: NO CIGS CURRENTLY. SMOKES PIPE SOMETIMES.     Sexual activity: Not on file   Lifestyle    Physical activity:     Days per week: Not on file     Minutes per session: Not on file    Stress: Not on file   Relationships    Social connections:     Talks on phone: Not on file     Gets together: Not on file     Attends [employer] service: Not on file     Active member of club or organization: Not on file     Attends meetings of clubs or organizations: Not on file     Relationship status: Not on file    Intimate partner violence:     Fear of current or ex partner: Not on file     Emotionally abused: Not on file     Physically abused: Not on file     Forced sexual activity: Not on file   Other Topics Concern     Service Not Asked    Blood Transfusions Not Asked    Caffeine Concern Not Asked    Occupational Exposure Not Asked    Hobby Hazards Not Asked    Sleep Concern Not Asked    Stress Concern Not Asked    Weight Concern Not Asked    Special Diet Not Asked    Back Care Not Asked    Exercise Not Asked    Bike Helmet Not Asked   [address] Not Asked    Self-Exams Not Asked   Social History Narrative    Not on file         ALLERGIES: Patient has no known allergies. Review of Systems   Constitutional: Negative for fever and weight loss. Cardiovascular: Negative for chest pain. Gastrointestinal: Positive for abdominal pain. Negative for bowel incontinence. Genitourinary: Positive for flank pain and pelvic pain. Negative for bladder incontinence and dysuria. Neurological: Negative for tingling, weakness, numbness, headaches and paresthesias. Vitals:    02/28/20 1423 02/28/20 1515   BP: 135/84 125/85   Pulse: 72    Resp: 18    Temp: 97.7 °F (36.5 °C)    SpO2: 97% 96%   Weight: 80 kg (176 lb 5.9 oz)    Height: 5' 10\" (1.778 m)             Physical Exam  Vitals signs and nursing note reviewed. Constitutional:       General: He is not in acute distress. Appearance: He is well-developed. He is not diaphoretic. HENT:      Head: Normocephalic and atraumatic. Eyes:      Pupils: Pupils are equal, round, and reactive to light. Neck:      Musculoskeletal: Normal range of motion and neck supple. Cardiovascular:      Rate and Rhythm: Normal rate and regular rhythm. Heart sounds: Normal heart sounds. No murmur. No friction rub. No gallop. Pulmonary:      Effort: Pulmonary effort is normal. No respiratory distress. Breath sounds: Normal breath sounds. No wheezing. Abdominal:      General: Bowel sounds are normal. There is no distension. Palpations: Abdomen is soft. Tenderness: There is no abdominal tenderness.  There is left CVA tenderness. There is no guarding or rebound. Musculoskeletal: Normal range of motion. Skin:     General: Skin is warm. Findings: No rash. Neurological:      Mental Status: He is alert and oriented to person, place, and time. Psychiatric:         Behavior: Behavior normal.         Thought Content: Thought content normal.         Judgment: Judgment normal.          MDM     This is a 47-year-old male with past medical history, review of systems, physical exam as above, presenting with complaints of left flank pain, in the setting of recently diagnosed UTI. Patient history significant for bladder cancer, status post debridement of urethral strictures, had been having dysuria, and flank pain, urine culture by his primary care physician and identified urinary tract infection, sensitive to Ravindra Kimmy. Patient states he is several days into his course, was reevaluated by his primary care physician today, with increased hematuria on microscopy. He denies gross hematuria, fevers, nausea or vomiting. Physical exam is remarkable for chronically ill-appearing elderly male, in no acute distress, noted to be afebrile, without tachycardia or hypotension, satting well on room air. He has a soft nontender abdomen, clear appearing urine at bedside, mild left flank tenderness to palpation. Differential includes pyelonephritis, renal colic, musculoskeletal pain. Plan to obtain CMP, CBC, UA, noncontrast CT. We will reassess, and make a disposition. Procedures    4:28 PM  Patient d/w Bg Dunlap [facility]) reviewed CT imaging and lab/uring, states unclear source of stranding and hydroureter/nephrosis, not clinically peylo w/o fever, tachycardia etc.  Recommends DC home with pain control, f/u with Dr. López Monson early next week, continue macrobid.

## 2020-02-28 NOTE — ED TRIAGE NOTES
Pt reports left flank pain. Pt had bladder surgery in December 2019 and February 12, 2020. Pt reports UA from PCP reported blood and bacteria in urine. Pt reports pain with urination x2 weeks. Pt is on antibiotics (macrobid). PCP referred pt to ER for CT scan. Pt denies fever.

## 2020-02-28 NOTE — DISCHARGE INSTRUCTIONS

## 2020-02-28 NOTE — ED NOTES
Patient requesting his discharge instructions be faxed to his physician at [facility]. Patient signing release of medical records and RN will fax information.

## 2020-02-28 NOTE — ED NOTES
The patient left the Emergency Department ambulatory, alert and oriented and in no acute distress with wife and service dog. The patient was encouraged to call or return to the ED for worsening issues or problems and was encouraged to schedule a follow up appointment for continuing care.      The patient verbalized understanding of discharge instructions, all questions were answered. The patient has no further concerns at this time.

## 2020-02-28 NOTE — ED NOTES
Pt requested discharge packet be faxed to Dr. Lisette Vasquez at [facility]. Pt signed consent. Discharge packet faxed. Fax confirmation received.

## 2022-03-18 PROBLEM — R79.89 ELEVATED SERUM CREATININE: Status: ACTIVE | Noted: 2019-06-07

## 2022-03-18 PROBLEM — D49.4 BLADDER TUMOR: Status: ACTIVE | Noted: 2019-12-26

## 2022-03-20 PROBLEM — M48.062 LUMBAR STENOSIS WITH NEUROGENIC CLAUDICATION: Status: ACTIVE | Noted: 2019-06-10

## 2022-11-10 ENCOUNTER — TRANSCRIBE ORDER (OUTPATIENT)
Dept: SCHEDULING | Age: 82
End: 2022-11-10

## 2022-11-10 DIAGNOSIS — R20.2 NUMBNESS AND TINGLING OF BOTH LEGS: Primary | ICD-10-CM

## 2022-11-10 DIAGNOSIS — R20.0 NUMBNESS AND TINGLING OF BOTH LEGS: Primary | ICD-10-CM

## 2023-01-09 ENCOUNTER — HOSPITAL ENCOUNTER (OUTPATIENT)
Dept: MRI IMAGING | Age: 83
Discharge: HOME | End: 2023-01-09
Attending: NEUROLOGICAL SURGERY
Payer: MEDICARE

## 2023-01-09 DIAGNOSIS — R20.0 NUMBNESS AND TINGLING OF BOTH LEGS: ICD-10-CM

## 2023-01-09 DIAGNOSIS — R20.2 NUMBNESS AND TINGLING OF BOTH LEGS: ICD-10-CM

## 2023-01-09 PROCEDURE — 72146 MRI CHEST SPINE W/O DYE: CPT

## 2023-01-09 PROCEDURE — 72148 MRI LUMBAR SPINE W/O DYE: CPT

## 2023-03-29 ENCOUNTER — TRANSCRIBE ORDER (OUTPATIENT)
Dept: SCHEDULING | Age: 83
End: 2023-03-29

## 2023-03-29 DIAGNOSIS — H02.845 EDEMA OF LEFT LOWER EYELID: Primary | ICD-10-CM

## 2023-03-31 ENCOUNTER — HOSPITAL ENCOUNTER (OUTPATIENT)
Dept: CT IMAGING | Age: 83
Discharge: HOME | End: 2023-03-31
Attending: OPHTHALMOLOGY
Payer: MEDICARE

## 2023-03-31 DIAGNOSIS — H02.845 EDEMA OF LEFT LOWER EYELID: ICD-10-CM

## 2023-03-31 PROCEDURE — 70487 CT MAXILLOFACIAL W/DYE: CPT

## 2023-03-31 PROCEDURE — 74011000636 HC RX REV CODE- 636: Performed by: OPHTHALMOLOGY

## 2023-03-31 RX ADMIN — IOPAMIDOL 100 ML: 755 INJECTION, SOLUTION INTRAVENOUS at 10:26

## (undated) DEVICE — SYR 20ML LL STRL LF --

## (undated) DEVICE — LEGGINGS: Brand: CONVERTORS

## (undated) DEVICE — BAG BELONG PT PERS CLEAR HANDL

## (undated) DEVICE — CYSTOSCOPY PACK: Brand: CONVERTORS

## (undated) DEVICE — MARKER,SKIN,WI/RULER AND LABELS: Brand: MEDLINE

## (undated) DEVICE — ENDO CARRY-ON PROCEDURE KIT INCLUDES ENZYMATIC SPONGE, GAUZE, BIOHAZARD LABEL, TRAY, LUBRICANT, DIRTY SCOPE LABEL, WATER LABEL, TRAY, DRAWSTRING PAD, AND DEFENDO 4-PIECE KIT.: Brand: ENDO CARRY-ON PROCEDURE KIT

## (undated) DEVICE — INFECTION CONTROL KIT SYS

## (undated) DEVICE — TOWEL SURG W17XL27IN STD BLU COT NONFENESTRATED PREWASHED

## (undated) DEVICE — BAG,DRAINAGE,4L,A/R TOWER,LL,SLIDE TAP: Brand: MEDLINE

## (undated) DEVICE — SOLUTION IRRIG 3000ML 0.9% SOD CHL FLX CONT 0797208] ICU MEDICAL INC]

## (undated) DEVICE — 1200 GUARD II KIT W/5MM TUBE W/O VAC TUBE: Brand: GUARDIAN

## (undated) DEVICE — SOLUTION IRRIGATION H2O 0797305] ICU MEDICAL INC]

## (undated) DEVICE — SYR 10ML LUER LOK 1/5ML GRAD --

## (undated) DEVICE — CATH URETH FOL 2W MED 20FRX30 -- CONVERT TO ITEM 363083

## (undated) DEVICE — GOWN,SIRUS,NONRNF,SETINSLV,XL,20/CS: Brand: MEDLINE

## (undated) DEVICE — STERILE POLYISOPRENE POWDER-FREE SURGICAL GLOVES: Brand: PROTEXIS

## (undated) DEVICE — SUTURE VCRL SZ 0 L18IN ABSRB VLT L36MM CT-1 1/2 CIR J740D

## (undated) DEVICE — SOLUTION SCRB 2OZ 10% POVIDONE IOD ANTIMIC BTL

## (undated) DEVICE — GARMENT,MEDLINE,DVT,INT,CALF,MED, GEN2: Brand: MEDLINE

## (undated) DEVICE — SNARE ENDOSCP M L240CM W27MM SHTH DIA2.4MM CHN 2.8MM OVL

## (undated) DEVICE — NEEDLE HYPO 18GA L1.5IN PNK S STL HUB POLYPR SHLD REG BVL

## (undated) DEVICE — AIRLIFE™ U/CONNECT-IT OXYGEN TUBING 7 FEET (2.1 M) CRUSH-RESISTANT OXYGEN TUBING, VINYL TIPPED: Brand: AIRLIFE™

## (undated) DEVICE — BONE WAX WHITE: Brand: BONE WAX WHITE

## (undated) DEVICE — REM POLYHESIVE ADULT PATIENT RETURN ELECTRODE: Brand: VALLEYLAB

## (undated) DEVICE — STONE RETRIEVAL BASKET: Brand: SEGURA HEMISPHERE

## (undated) DEVICE — Z DISCONTINUED USE 2751540 TUBING IRRIG L10IN DISP PMP ENDOGATOR

## (undated) DEVICE — SUTURE VCRL SZ 2-0 L18IN ABSRB UD L26MM CP-2 1/2 CIR REV J762D

## (undated) DEVICE — FORCEPS BX L240CM JAW DIA2.8MM L CAP W/ NDL MIC MESH TOOTH

## (undated) DEVICE — BAG COLLECTION FLD OR-TBL NS --

## (undated) DEVICE — SOLUTION IRRIG 1000ML H2O STRL BLT

## (undated) DEVICE — KENDALL RADIOLUCENT FOAM MONITORING ELECTRODE -RECTANGULAR SHAPE: Brand: KENDALL

## (undated) DEVICE — Device: Brand: MEDICAL ACTION INDUSTRIES

## (undated) DEVICE — SOLIDIFIER FLUID 3000 CC ABSORB

## (undated) DEVICE — STRAP,POSITIONING,KNEE/BODY,FOAM,4X60": Brand: MEDLINE

## (undated) DEVICE — HANDLE LT SNAP ON ULT DURABLE LENS FOR TRUMPF ALC DISPOSABLE

## (undated) DEVICE — CANISTER, RIGID, 3000CC: Brand: MEDLINE INDUSTRIES, INC.

## (undated) DEVICE — TRAP SURG QUAD PARABOLA SLOT DSGN SFTY SCRN TRAPEASE

## (undated) DEVICE — OPEN-END URETERAL CATHETER: Brand: COOK

## (undated) DEVICE — TUBING IRRIG L96IN DIA0.241IN L BOR T-U-R W/ NVENT PIERCING

## (undated) DEVICE — QUILTED PREMIUM COMFORT UNDERPAD,EXTRA HEAVY: Brand: WINGS

## (undated) DEVICE — SYRINGE MED 20ML STD CLR PLAS LUERLOCK TIP N CTRL DISP

## (undated) DEVICE — STERILE POLYISOPRENE POWDER-FREE SURGICAL GLOVES WITH EMOLLIENT COATING: Brand: PROTEXIS

## (undated) DEVICE — BAG SPEC BIOHZD LF 2MIL 6X10IN -- CONVERT TO ITEM 357326

## (undated) DEVICE — INTENDED FOR TISSUE SEPARATION, AND OTHER PROCEDURES THAT REQUIRE A SHARP SURGICAL BLADE TO PUNCTURE OR CUT.: Brand: BARD-PARKER ® CARBON RIB-BACK BLADES

## (undated) DEVICE — TUBING, SUCTION, 1/4" X 10', STRAIGHT: Brand: MEDLINE

## (undated) DEVICE — ENDOSCOPIC VALVE WITH ADAPTER.: Brand: SURSEAL® II

## (undated) DEVICE — PREP SKN PREVAIL 40ML APPL --

## (undated) DEVICE — MEDI-VAC NON-CONDUCTIVE SUCTION TUBING: Brand: CARDINAL HEALTH

## (undated) DEVICE — BIPOLAR IRRIGATOR INTEGRATED TUBING AND BIPOLAR CORD SET, DISPOSABLE

## (undated) DEVICE — SOLUTION IV 250ML 0.9% SOD CHL CLR INJ FLX BG CONT PRT CLSR

## (undated) DEVICE — DRAPE SURG W41XL74IN CLR FULL SZ C ARM 3 ADH POLY STRP E

## (undated) DEVICE — CONTAINER SPEC 20 ML LID NEUT BUFF FORMALIN 10 % POLYPR STS

## (undated) DEVICE — CATH IV AUTOGRD BC BLU 22GA 25 -- INSYTE

## (undated) DEVICE — CATH URETH FOL 2W SH 24FRX5ML -- CONVERT TO ITEM 181871

## (undated) DEVICE — SPONGE GZ W4XL4IN COT 12 PLY TYP VII WVN C FLD DSGN

## (undated) DEVICE — CUTTING ELECTRODE BIPO 24-26FR 12/30°  FOR RESECTOSCOPES, TELESCOPE Ø 4MM, FOR SHEATHS, INTERMITTENT/CONTINUOUS IRRIGATION, 24/26, FR, LOOP: ROUND, WIRE Ø 0.25MM, FORK COLOR BLUE, STEM COLOR BLUE, PACK=3 PCS, FOR SHARK AND S-LINE RESECTOSCOPES, STERILE, FOR SINGLE USE: Brand: SHARK/S-LINE

## (undated) DEVICE — SET ADMIN 16ML TBNG L100IN 2 Y INJ SITE IV PIGGY BK DISP

## (undated) DEVICE — TUBING IRRIG L77IN DIA0.241IN L BOR FOR CYSTO W/ NVENT

## (undated) DEVICE — BASIC SINGLE BASIN BTC-LF: Brand: MEDLINE INDUSTRIES, INC.

## (undated) DEVICE — POSITIONER HD REST FOAM CMFRT TCH

## (undated) DEVICE — FLOSEAL MATRIX IS INDICATED IN SURGICAL PROCEDURES (OTHER THAN IN OPHTHALMIC) AS AN ADJUNCT TO HEMOSTASIS WHEN CONTROL OF BLEEDING BY LIGATURE OR CONVENTIONALPROCEDURES IS INEFFECTIVE OR IMPRACTICAL.: Brand: FLOSEAL HEMOSTATIC MATRIX

## (undated) DEVICE — SOLUTION IV 1000ML 0.9% SOD CHL

## (undated) DEVICE — 3M™ RANGER™ FLUID WARMING IRRIGATION SET, 24750, 10/CASE: Brand: 3M™ RANGER™

## (undated) DEVICE — CATHETER,FOLEY,SILI-ELAST,LTX,24FR,30ML: Brand: MEDLINE

## (undated) DEVICE — Z DISCONTINUED NO SUB IDED SET EXTN W/ 4 W STPCOCK M SPIN LOK 36IN

## (undated) DEVICE — CONNECTOR TBNG AUX H2O JET DISP FOR OLY 160/180 SER

## (undated) DEVICE — LAMINECTOMY RICHMOND-LF: Brand: MEDLINE INDUSTRIES, INC.

## (undated) DEVICE — SURGIFOAM SPNG SZ 100

## (undated) DEVICE — JELLY,LUBE,STERILE,FLIP TOP,TUBE,4-OZ: Brand: MEDLINE

## (undated) DEVICE — BW-412T DISP COMBO CLEANING BRUSH: Brand: SINGLE USE COMBINATION CLEANING BRUSH

## (undated) DEVICE — CLIP MED L235CM L2.8MM 11MM OPN HEMSTAT FIX RESOL

## (undated) DEVICE — DEVICE SECUREMENT 1/32IN POLYETH FOAM F ANCHR URIN CATH

## (undated) DEVICE — 4-PORT MANIFOLD: Brand: NEPTUNE 2

## (undated) DEVICE — GOWN,PLEAT,SPECIALTY,XL,STRL: Brand: MEDLINE